# Patient Record
Sex: MALE | Race: WHITE | Employment: FULL TIME | URBAN - METROPOLITAN AREA
[De-identification: names, ages, dates, MRNs, and addresses within clinical notes are randomized per-mention and may not be internally consistent; named-entity substitution may affect disease eponyms.]

---

## 2017-10-04 DIAGNOSIS — I10 ESSENTIAL HYPERTENSION, BENIGN: ICD-10-CM

## 2017-10-04 RX ORDER — LOSARTAN POTASSIUM AND HYDROCHLOROTHIAZIDE 12.5; 5 MG/1; MG/1
TABLET ORAL
Qty: 90 TAB | Refills: 3 | Status: SHIPPED | OUTPATIENT
Start: 2017-10-04 | End: 2019-01-07 | Stop reason: SDUPTHER

## 2017-11-20 ENCOUNTER — OFFICE VISIT (OUTPATIENT)
Dept: INTERNAL MEDICINE CLINIC | Age: 32
End: 2017-11-20

## 2017-11-20 VITALS
HEIGHT: 73 IN | HEART RATE: 85 BPM | BODY MASS INDEX: 31.7 KG/M2 | WEIGHT: 239.2 LBS | DIASTOLIC BLOOD PRESSURE: 78 MMHG | SYSTOLIC BLOOD PRESSURE: 125 MMHG | OXYGEN SATURATION: 97 % | TEMPERATURE: 98.4 F | RESPIRATION RATE: 16 BRPM

## 2017-11-20 DIAGNOSIS — F43.0 STRESS REACTION: ICD-10-CM

## 2017-11-20 DIAGNOSIS — I10 ESSENTIAL HYPERTENSION, BENIGN: ICD-10-CM

## 2017-11-20 DIAGNOSIS — Z00.00 PHYSICAL EXAM: Primary | ICD-10-CM

## 2017-11-20 DIAGNOSIS — Z23 NEED FOR TDAP VACCINATION: ICD-10-CM

## 2017-11-20 NOTE — PATIENT INSTRUCTIONS
Testicular Self-Exam: Care Instructions  Your Care Instructions    A self-exam is a way for you to check for cancer of the testicles. Although testicular cancer is rare, it is one of the most common tumors in men younger than 28. Many testicular cancers are found during self-exam. In the early stages of testicular cancer, the lump, which may be about the size of a pea, usually is not painful. Testicular cancer, especially if treated early, is very often cured. By doing this self-exam regularly, you can learn the normal size, shape, and weight of your testicles. This allows you to note any changes. Follow-up care is a key part of your treatment and safety. Be sure to make and go to all appointments, and call your doctor if you are having problems. It's also a good idea to know your test results and keep a list of the medicines you take. How can you care for yourself at home? · The exam is best done during or after a bath or shower-when the scrotum, the skin sac that holds the testicles, is relaxed. · Stand and place your right leg on a raised surface about chair height. Then gently feel your scrotum until you find the right testicle. · Roll the testicle gently but firmly between your thumb and fingers of both hands. Carefully feel the surface for lumps. Feel for any change in the size, shape, or texture of the testicle. The testicle should feel round and smooth. It is normal for one testicle to be slightly larger than the other one. · Repeat this for the left side. Feel the entire surface of both testicles. · You may feel the epididymis, the soft tube behind each testicle. Become familiar with this structure so that you won't mistake it for a lump. When should you call for help? Call your doctor now or seek immediate medical care if:  ? · You have pain in a testicle. ? Watch closely for changes in your health, and be sure to contact your doctor if:  ? · You notice a change in a testicle.    ? · You notice a lump in a testicle. Where can you learn more? Go to http://italia-andrea.info/. Enter D958 in the search box to learn more about \"Testicular Self-Exam: Care Instructions. \"  Current as of: March 14, 2017  Content Version: 11.4  © 8185-0116 [employer]. Care instructions adapted under license by [employer] (which disclaims liability or warranty for this information). If you have questions about a medical condition or this instruction, always ask your healthcare professional. [address] any warranty or liability for your use of this information.

## 2017-11-20 NOTE — MR AVS SNAPSHOT
Visit Information Date & Time Provider Department Dept. Phone Encounter #  
 11/20/2017  3:00 PM Kristel Julien, [address] [place] [address] Internal Medicine [phone] [id number] Follow-up Instructions Return in about 1 year (around 11/20/2018). Upcoming Health Maintenance Date Due DTaP/Tdap/Td series (2 - Td) 11/20/2027 Allergies as of 11/20/2017  Review Complete On: 11/20/2017 By: Kristel Julien MD  
 No Known Allergies Current Immunizations  Reviewed on 11/20/2017 Name Date Influenza Vaccine 1/6/2017, 10/31/2013 Td 1/1/2010 Tdap 11/20/2017 Reviewed by Dariana Mathew on 11/20/2017 at  3:36 PM  
You Were Diagnosed With   
  
 Codes Comments Physical exam    -  Primary ICD-10-CM: Z00.00 ICD-9-CM: V70.9 Need for Tdap vaccination     ICD-10-CM: X78 ICD-9-CM: V06.1 Essential hypertension, benign     ICD-10-CM: I10 
ICD-9-CM: 401.1 Stress reaction     ICD-10-CM: F43.0 ICD-9-CM: 308. 9 Vitals BP Pulse Temp Resp Height(growth percentile) Weight(growth percentile) 125/78 85 98.4 °F (36.9 °C) (Oral) 16 6' 1\" (1.854 m) 239 lb 3.2 oz (108.5 kg) SpO2 BMI Smoking Status 97% 31.56 kg/m2 Never Smoker Vitals History BMI and BSA Data Body Mass Index Body Surface Area  
 31.56 kg/m 2 2.36 m 2 Preferred Pharmacy Pharmacy Name Phone [phone] [phone] Your Updated Medication List  
  
   
This list is accurate as of: 11/20/17  4:08 PM.  Always use your most recent med list.  
  
  
  
  
 losartan-hydroCHLOROthiazide 50-12.5 mg per tablet Commonly known as:  HYZAAR  
TAKE 1 TABLET BY MOUTH EVERY DAY We Performed the Following CBC WITH AUTOMATED DIFF [51155 CPT(R)] LIPID PANEL [08709 CPT(R)] METABOLIC PANEL, COMPREHENSIVE [24640 CPT(R)] REFERRAL TO DERMATOLOGY [REF19 Custom] TETANUS, DIPHTHERIA TOXOIDS AND ACELLULAR PERTUSSIS VACCINE (TDAP), IN INDIVIDS. >=7, IM [id number] CPT(R)] TSH 3RD GENERATION [80508 CPT(R)] URINALYSIS W/ RFLX MICROSCOPIC [28152 CPT(R)] Follow-up Instructions Return in about 1 year (around 11/20/2018). Referral Information Referral ID Referred By Referred To  
  
 [id number] Sven DENT MD   
   [address] [facility] [address] [place] [address] Avenue Phone: [phone] Fax: [phone] Visits Status Start Date End Date 1 New Request 11/20/17 11/20/18 If your referral has a status of pending review or denied, additional information will be sent to support the outcome of this decision. Patient Instructions Testicular Self-Exam: Care Instructions Your Care Instructions A self-exam is a way for you to check for cancer of the testicles. Although testicular cancer is rare, it is one of the most common tumors in men younger than 28. Many testicular cancers are found during self-exam. In the early stages of testicular cancer, the lump, which may be about the size of a pea, usually is not painful. Testicular cancer, especially if treated early, is very often cured. By doing this self-exam regularly, you can learn the normal size, shape, and weight of your testicles. This allows you to note any changes. Follow-up care is a key part of your treatment and safety. Be sure to make and go to all appointments, and call your doctor if you are having problems. It's also a good idea to know your test results and keep a list of the medicines you take. How can you care for yourself at home? · The exam is best done during or after a bath or shower-when the scrotum, the skin sac that holds the testicles, is relaxed. · Stand and place your right leg on a raised surface about chair height. Then gently feel your scrotum until you find the right testicle. · Roll the testicle gently but firmly between your thumb and fingers of both hands. Carefully feel the surface for lumps. Feel for any change in the size, shape, or texture of the testicle. The testicle should feel round and smooth. It is normal for one testicle to be slightly larger than the other one. · Repeat this for the left side. Feel the entire surface of both testicles. · You may feel the epididymis, the soft tube behind each testicle. Become familiar with this structure so that you won't mistake it for a lump. When should you call for help? Call your doctor now or seek immediate medical care if: 
? · You have pain in a testicle. ? Watch closely for changes in your health, and be sure to contact your doctor if: 
? · You notice a change in a testicle. ? · You notice a lump in a testicle. Where can you learn more? Go to http://italia-andrea.info/. Enter H286 in the search box to learn more about \"Testicular Self-Exam: Care Instructions. \" Current as of: March 14, 2017 Content Version: 11.4 © 9263-8899 [employer]. Care instructions adapted under license by [employer] (which disclaims liability or warranty for this information). If you have questions about a medical condition or this instruction, always ask your healthcare professional. [place] [address] any warranty or liability for your use of this information. Introducing [facility]! [employer] introduces boarding pass patient portal. Now you can access parts of your medical record, email your doctor's office, and request medication refills online. 1. In your internet browser, go to [email or web address] 2. Click on the First Time User? Click Here link in the Sign In box. You will see the New Member Sign Up page. 3. Enter your boarding pass Access Code exactly as it appears below.  You will not need to use this code after youve completed the sign-up process. If you do not sign up before the expiration date, you must request a new code. · [employer]u Access Code: [email or web address] Expires: 2/18/2018  4:08 PM 
 
4. Enter the last four digits of your Social Security Number (xxxx) and Date of Birth (mm/dd/yyyy) as indicated and click Submit. You will be taken to the next sign-up page. 5. Create a Chu Shu ID. This will be your [employer] Sh[employer] login ID and cannot be changed, so think of one that is secure and easy to remember. 6. Create a [employer] Shu password. You can change your password at any time. 7. Enter your Password Reset Question and Answer. This can be used at a later time if you forget your password. 8. Enter your e-mail address. You will receive e-mail notification when new information is available in [address]. 9. Click Sign Up. You can now view and download portions of your medical record. 10. Click the Download Summary menu link to download a portable copy of your medical information. If you have questions, please visit the Frequently Asked Questions section of the [employer] website. Remember, [employer]u is NOT to be used for urgent needs. For medical emergencies, dial 911. Now available from your iPhone and Android! Please provide this summary of care documentation to your next provider. Your primary care clinician is listed as MIAH DENT. If you have any questions after today's visit, please call [phone].

## 2017-11-20 NOTE — PROGRESS NOTES
HISTORY OF PRESENT ILLNESS  Braulio Streeter is a 27 y.o. male. HPI Shila Vogel is seen today for a complete physical examination and follow up of chronic problems. Preventive medicine. Fully reviewed today. He is due for a complete physical examination and routine screening laboratory studies. Also, due for Tdap booster. Chronic medical problems are reviewed. Home blood pressures are excellent averaging 125/78. Review of systems notable for some skin tags. Social history notable for having an 25month old child at this time. The child is healthy. Family history notable for his brother passing away due to heroin overdose. Further, his father has multiple myeloma. '    Review of systems notable for some stress symptoms secondary to this. He had a spell of \"feeling out of it. \"  He denies a loss of consciousness. This happened three times, all in the evening after a hard day at work. The last occasion was five weeks ago. I suspect this is stemming more from a stress reaction but if it is recurrent, I would refer to neurology. MedDATA/gwo     We counseled regarding healthy lifestyle issues including diet, exercise and stress management. Family history, social history, etc. Are reviewed and updated, see electronic record. Review of Systems   Constitutional: Positive for malaise/fatigue. Negative for weight loss. Respiratory: Negative. Cardiovascular: Negative for chest pain, palpitations, leg swelling and PND. Gastrointestinal: Negative. Genitourinary: Negative. Musculoskeletal: Negative for myalgias. Neurological: Negative for focal weakness. Psychiatric/Behavioral: The patient has insomnia. Physical Exam   Constitutional: He is oriented to person, place, and time. He appears well-developed and well-nourished. No distress. HENT:   Head: Normocephalic and atraumatic.    Right Ear: Tympanic membrane, external ear and ear canal normal.   Left Ear: Tympanic membrane, external ear and ear canal normal.   Eyes: EOM are normal. Pupils are equal, round, and reactive to light. Right eye exhibits no discharge. Left eye exhibits no discharge. Neck: Normal range of motion. Neck supple. Carotid bruit is not present. No thyromegaly present. Cardiovascular: Normal rate, regular rhythm, normal heart sounds and intact distal pulses. Exam reveals no gallop and no friction rub. No murmur heard. Pulmonary/Chest: Effort normal and breath sounds normal. No respiratory distress. He has no wheezes. He has no rales. Abdominal: Soft. Bowel sounds are normal. He exhibits no distension and no mass. There is no tenderness. There is no rebound and no guarding. Musculoskeletal: Normal range of motion. He exhibits no edema or tenderness. Lymphadenopathy:     He has no cervical adenopathy. Neurological: He is alert and oriented to person, place, and time. He has normal reflexes. Skin: Skin is warm and dry. No rash noted. Psychiatric: He has a normal mood and affect. His behavior is normal.   Nursing note and vitals reviewed. ASSESSMENT and PLAN  Diagnoses and all orders for this visit:    1. Physical exam  -     TSH 3RD GENERATION  -     METABOLIC PANEL, COMPREHENSIVE  -     CBC WITH AUTOMATED DIFF  -     LIPID PANEL  -     URINALYSIS W/ RFLX MICROSCOPIC  -     REFERRAL TO DERMATOLOGY    2. Need for Tdap vaccination  -     TETANUS, DIPHTHERIA TOXOIDS AND ACELLULAR PERTUSSIS VACCINE (TDAP), IN INDIVIDS. >=7, IM    3. Essential hypertension, benign- Continue current regimen of prescription and / or OTC medications     4.  Stress reaction- Call with recurrence

## 2018-01-16 LAB
ALBUMIN SERPL-MCNC: 4.7 G/DL (ref 3.5–5.5)
ALBUMIN/GLOB SERPL: 1.6 {RATIO} (ref 1.2–2.2)
ALP SERPL-CCNC: 63 IU/L (ref 39–117)
ALT SERPL-CCNC: 74 IU/L (ref 0–44)
APPEARANCE UR: CLEAR
AST SERPL-CCNC: 48 IU/L (ref 0–40)
BASOPHILS # BLD AUTO: 0.1 X10E3/UL (ref 0–0.2)
BASOPHILS NFR BLD AUTO: 1 %
BILIRUB SERPL-MCNC: 0.3 MG/DL (ref 0–1.2)
BILIRUB UR QL STRIP: NEGATIVE
BUN SERPL-MCNC: 16 MG/DL (ref 6–20)
BUN/CREAT SERPL: 17 (ref 9–20)
CALCIUM SERPL-MCNC: 9.6 MG/DL (ref 8.7–10.2)
CHLORIDE SERPL-SCNC: 99 MMOL/L (ref 96–106)
CHOLEST SERPL-MCNC: 266 MG/DL (ref 100–199)
CO2 SERPL-SCNC: 23 MMOL/L (ref 18–29)
COLOR UR: YELLOW
CREAT SERPL-MCNC: 0.93 MG/DL (ref 0.76–1.27)
EOSINOPHIL # BLD AUTO: 0.3 X10E3/UL (ref 0–0.4)
EOSINOPHIL NFR BLD AUTO: 5 %
ERYTHROCYTE [DISTWIDTH] IN BLOOD BY AUTOMATED COUNT: 13.9 % (ref 12.3–15.4)
GLOBULIN SER CALC-MCNC: 2.9 G/DL (ref 1.5–4.5)
GLUCOSE SERPL-MCNC: 98 MG/DL (ref 65–99)
GLUCOSE UR QL: NEGATIVE
HCT VFR BLD AUTO: 45.2 % (ref 37.5–51)
HDLC SERPL-MCNC: 41 MG/DL
HGB BLD-MCNC: 15.6 G/DL (ref 13–17.7)
HGB UR QL STRIP: NEGATIVE
IMM GRANULOCYTES # BLD: 0 X10E3/UL (ref 0–0.1)
IMM GRANULOCYTES NFR BLD: 1 %
KETONES UR QL STRIP: NEGATIVE
LDLC SERPL CALC-MCNC: 168 MG/DL (ref 0–99)
LEUKOCYTE ESTERASE UR QL STRIP: NEGATIVE
LYMPHOCYTES # BLD AUTO: 2.3 X10E3/UL (ref 0.7–3.1)
LYMPHOCYTES NFR BLD AUTO: 39 %
MCH RBC QN AUTO: 32 PG (ref 26.6–33)
MCHC RBC AUTO-ENTMCNC: 34.5 G/DL (ref 31.5–35.7)
MCV RBC AUTO: 93 FL (ref 79–97)
MICRO URNS: NORMAL
MONOCYTES # BLD AUTO: 0.6 X10E3/UL (ref 0.1–0.9)
MONOCYTES NFR BLD AUTO: 10 %
NEUTROPHILS # BLD AUTO: 2.6 X10E3/UL (ref 1.4–7)
NEUTROPHILS NFR BLD AUTO: 44 %
NITRITE UR QL STRIP: NEGATIVE
PH UR STRIP: 5.5 [PH] (ref 5–7.5)
PLATELET # BLD AUTO: 261 X10E3/UL (ref 150–379)
POTASSIUM SERPL-SCNC: 4.2 MMOL/L (ref 3.5–5.2)
PROT SERPL-MCNC: 7.6 G/DL (ref 6–8.5)
PROT UR QL STRIP: NEGATIVE
RBC # BLD AUTO: 4.88 X10E6/UL (ref 4.14–5.8)
SODIUM SERPL-SCNC: 139 MMOL/L (ref 134–144)
SP GR UR: 1.02 (ref 1–1.03)
TRIGL SERPL-MCNC: 286 MG/DL (ref 0–149)
TSH SERPL DL<=0.005 MIU/L-ACNC: 6.51 UIU/ML (ref 0.45–4.5)
UROBILINOGEN UR STRIP-MCNC: 0.2 MG/DL (ref 0.2–1)
VLDLC SERPL CALC-MCNC: 57 MG/DL (ref 5–40)
WBC # BLD AUTO: 5.9 X10E3/UL (ref 3.4–10.8)

## 2018-01-16 NOTE — PROGRESS NOTES
[facility] - spoke to [employer] - added on ggt, cpk (dx abnormal transaminase enzymes - R74.0) and thyroid peroxidase antibody (dx abnormal thyroid function - R94.6).

## 2018-01-16 NOTE — PROGRESS NOTES
Add on thyroid peroxidase antibody.  Dx= abnormal thyroid function \  Add on ggt and cpk- Dx= abnormal transaminase enzymes   Will Review at followup

## 2018-01-17 LAB
CK SERPL-CCNC: 157 U/L (ref 24–204)
GGT SERPL-CCNC: 120 IU/L (ref 0–65)
SPECIMEN STATUS REPORT, ROLRST: NORMAL
THYROPEROXIDASE AB SERPL-ACNC: 116 IU/ML (ref 0–34)

## 2018-01-18 ENCOUNTER — OFFICE VISIT (OUTPATIENT)
Dept: INTERNAL MEDICINE CLINIC | Age: 33
End: 2018-01-18

## 2018-01-18 VITALS
BODY MASS INDEX: 32.71 KG/M2 | SYSTOLIC BLOOD PRESSURE: 122 MMHG | HEART RATE: 112 BPM | HEIGHT: 73 IN | RESPIRATION RATE: 18 BRPM | DIASTOLIC BLOOD PRESSURE: 82 MMHG | TEMPERATURE: 98.2 F | OXYGEN SATURATION: 96 % | WEIGHT: 246.8 LBS

## 2018-01-18 DIAGNOSIS — I10 ESSENTIAL HYPERTENSION, BENIGN: ICD-10-CM

## 2018-01-18 DIAGNOSIS — E06.3 AUTOIMMUNE HYPOTHYROIDISM: Primary | ICD-10-CM

## 2018-01-18 DIAGNOSIS — E78.2 MIXED HYPERLIPIDEMIA: ICD-10-CM

## 2018-01-18 RX ORDER — LEVOTHYROXINE SODIUM 75 UG/1
75 TABLET ORAL
Qty: 30 TAB | Refills: 11 | Status: SHIPPED | OUTPATIENT
Start: 2018-01-18 | End: 2018-02-14 | Stop reason: SDUPTHER

## 2018-01-18 NOTE — PATIENT INSTRUCTIONS

## 2018-01-18 NOTE — PROGRESS NOTES
HISTORY OF PRESENT ILLNESS  Ginny Bryant is a 27 y.o. male. LEX Ortiz is seen today accompanied by his wife for follow up of possible anxiety as well as lab abnormalities. 1. Anxiety. He is in counseling which he has found very helpful. He has had a lot of stress in his life recently with the death of his brother, a new child at home as well as work stress. Physical symptoms of his anxiety include dizziness and sleep problems. Sleep issues are certainly chronic but possibly worsened recently given stressors. Question sleep apnea has also been raised. He has severe nightmares at times. Upon further questioning, it turns out he has quite poor sleep hygiene often times sleeping on the couch, falling asleep in front of the television. I reviewed with him some basics of setting a particular bedtime and trying to stick to that. Also, avoiding electronic stimulation prior to bed. If his symptoms persist following treatment of other medical problems, see below, we will defer for a sleep medicine evaluation. If his anxiety is persistent at that time, we will also consider medication treatment. 2. Hypothyroidism. His TSH is elevated with an abnormal TPO antibody. Reviewed with them treatment with Levothyroxine. This could very well benefit many of his problems. Check in three months. 3. Hyperlipidemia. Reviewed some diet changes, but will also check with treatment of his thyroid disease. 4. Abnormal GGT. About a month ago he cut way back on alcohol, in fact, is abstinent at this time. We will recheck in three months. 5. Elevated blood pressure. Much better at home. Review of systems notable for overall low energy. He wonders about low testosterone. His weight is increased. MedDATA/gwo     History reviewed. No pertinent past medical history. Current Outpatient Prescriptions   Medication Sig    levothyroxine (SYNTHROID) 75 mcg tablet Take 1 Tab by mouth Daily (before breakfast).  Indications: Hashimoto Thyroiditis    losartan-hydroCHLOROthiazide (HYZAAR) 50-12.5 mg per tablet TAKE 1 TABLET BY MOUTH EVERY DAY     No current facility-administered medications for this visit. Review of Systems   Constitutional: Positive for malaise/fatigue. Neurological: Positive for dizziness. Physical Exam   Constitutional: No distress. Neck: No thyromegaly present. Cardiovascular: Normal rate and regular rhythm. Exam reveals no gallop and no friction rub. No murmur heard. Pulmonary/Chest: Effort normal and breath sounds normal.   Nursing note and vitals reviewed. ASSESSMENT and PLAN  Diagnoses and all orders for this visit:    1. Autoimmune hypothyroidism  -     levothyroxine (SYNTHROID) 75 mcg tablet; Take 1 Tab by mouth Daily (before breakfast). Indications: Hashimoto Thyroiditis  -     TSH 3RD GENERATION  -     T3, FREE  -     T4, FREE    2. Essential hypertension, benign  -     levothyroxine (SYNTHROID) 75 mcg tablet; Take 1 Tab by mouth Daily (before breakfast). Indications: Hashimoto Thyroiditis  -     TSH 3RD GENERATION  -     T3, FREE  -     T4, FREE    3. Mixed hyperlipidemia- jania once thyroid is normalized  -     levothyroxine (SYNTHROID) 75 mcg tablet; Take 1 Tab by mouth Daily (before breakfast). Indications: Hashimoto Thyroiditis  -     TSH 3RD GENERATION  -     T3, FREE  -     T4, FREE    4.  Abnormal LFT- jania once thyroid is normalized    Plan was reviewed with patient and family, understanding expressed

## 2018-01-18 NOTE — MR AVS SNAPSHOT
[address] [facility] [address] [place] [address] 
[phone] Patient: Donte Garcia MRN: [id number] ZEQ:[id number]/[id number] Visit Information Date & Time Provider Department Dept. Phone Encounter #  
 1/18/2018  3:25 PM Cyrus Woodard, [facility] Internal Medicine [phone] [id number] Follow-up Instructions Return in about 3 months (around 4/18/2018). Your Appointments 11/21/2018  3:50 PM  
COMPLETE PHYSICAL with Cyrus Woodard MD  
[facility] Internal Medicine [facility]) Appt Note: CPE (11/20/17)  
 [address] [facility] [address] [place] [address] [place] [address]  
[facility] [address] [address] [place] [address] Upcoming Health Maintenance Date Due DTaP/Tdap/Td series (2 - Td) 11/20/2027 Allergies as of 1/18/2018  Review Complete On: 1/18/2018 By: Cyrus Woodard MD  
 No Known Allergies Current Immunizations  Reviewed on 11/20/2017 Name Date Influenza Vaccine 1/6/2017, 10/31/2013 Td 1/1/2010 Tdap 11/20/2017 Not reviewed this visit You Were Diagnosed With   
  
 Codes Comments Autoimmune hypothyroidism    -  Primary ICD-10-CM: E03.9 ICD-9-CM: 244.9 Essential hypertension, benign     ICD-10-CM: I10 
ICD-9-CM: 401.1 Mixed hyperlipidemia     ICD-10-CM: E78.2 ICD-9-CM: 272.2 Vitals BP Pulse Temp Resp Height(growth percentile) Weight(growth percentile) 122/82 (!) 112 98.2 °F (36.8 °C) (Oral) 18 6' 1\" (1.854 m) 246 lb 12.8 oz (111.9 kg) SpO2 BMI Smoking Status 96% 32.56 kg/m2 Never Smoker Vitals History BMI and BSA Data Body Mass Index Body Surface Area 32.56 kg/m 2 2.4 m 2 Preferred Pharmacy Pharmacy Name Phone [id number] Elis Heredia, [address] [facility] [phone] Your Updated Medication List  
  
   
 This list is accurate as of: 1/18/18  4:23 PM.  Always use your most recent med list.  
  
  
  
  
 levothyroxine 75 mcg tablet Commonly known as:  SYNTHROID Take 1 Tab by mouth Daily (before breakfast). Indications: Hashimoto Thyroiditis  
  
 losartan-hydroCHLOROthiazide 50-12.5 mg per tablet Commonly known as:  HYZAAR  
TAKE 1 TABLET BY MOUTH EVERY DAY Prescriptions Sent to Pharmacy Refills  
 levothyroxine (SYNTHROID) 75 mcg tablet 11 Sig: Take 1 Tab by mouth Daily (before breakfast). Indications: Hashimoto Thyroiditis Class: Normal  
 [facility], [address] [place] [address] #: [phone] Route: Oral  
  
We Performed the Following T3, FREE [id number] CPT(R)] T4, FREE [id number] CPT(R)] TSH 3RD GENERATION [16643 CPT(R)] Follow-up Instructions Return in about 3 months (around 4/18/2018). Patient Instructions Hypothyroidism: Care Instructions Your Care Instructions You have hypothyroidism, which means that your body is not making enough thyroid hormone. This hormone helps your body use energy. If your thyroid level is low, you may feel tired, be constipated, have an increase in your blood pressure, or have dry skin or memory problems. You may also get cold easily, even when it is warm. Women with low thyroid levels may have heavy menstrual periods. A blood test to find your thyroid-stimulating hormone (TSH) level is used to check for hypothyroidism. A high TSH level may mean that you have low thyroid. When your body is not making enough thyroid hormone, TSH levels rise in an effort to make the body produce more. The treatment for hypothyroidism is to take thyroid hormone pills. You should start to feel better in 1 to 2 weeks. But it can take several months to see changes in the TSH level. You will need regular visits with your doctor to make sure you have the right dose of medicine. Most people need treatment for the rest of their lives. You will need to see your doctor regularly to have blood tests and to make sure you are doing well. Follow-up care is a key part of your treatment and safety. Be sure to make and go to all appointments, and call your doctor if you are having problems. It's also a good idea to know your test results and keep a list of the medicines you take. How can you care for yourself at home? · Take your thyroid hormone medicine exactly as prescribed. Call your doctor if you think you are having a problem with your medicine. Most people do not have side effects if they take the right amount of medicine regularly. ¨ Take the medicine 30 minutes before breakfast, and do not take it with calcium, vitamins, or iron. ¨ Do not take extra doses of your thyroid medicine. It will not help you get better any faster, and it may cause side effects. ¨ If you forget to take a dose, do NOT take a double dose of medicine. Take your usual dose the next day. · Tell your doctor about all prescription, herbal, or over-the-counter products you take. · Take care of yourself. Eat a healthy diet, get enough sleep, and get regular exercise. When should you call for help? Call 911 anytime you think you may need emergency care. For example, call if: 
? · You passed out (lost consciousness). ? · You have severe trouble breathing. ? · You have a very slow heartbeat (less than 60 beats a minute). ? · You have a low body temperature (95°F or below). ?Call your doctor now or seek immediate medical care if: 
? · You feel tired, sluggish, or weak. ? · You have trouble remembering things or concentrating. ? · You do not begin to feel better 2 weeks after starting your medicine. ? Watch closely for changes in your health, and be sure to contact your doctor if you have any problems. Where can you learn more? Go to http://italia-andrea.info/. Enter V032 in the search box to learn more about \"Hypothyroidism: Care Instructions. \" Current as of: May 12, 2017 Content Version: 11.4 © 9486-3306 Healthwise, Repligen. Care instructions adapted under license by [employer] (which disclaims liability or warranty for this information). If you have questions about a medical condition or this instruction, always ask your healthcare professional. [address] any warranty or liability for your use of this information. Introducing [facility] & HEALTH SERVICES! [facility] introduces 5151tuan patient portal. Now you can access parts of your medical record, email your doctor's office, and request medication refills online. 1. In your internet browser, go to [email or web address] 2. Click on the First Time User? Click Here link in the Sign In box. You will see the New Member Sign Up page. 3. Enter your 5151tuan Access Code exactly as it appears below. You will not need to use this code after youve completed the sign-up process. If you do not sign up before the expiration date, you must request a new code. · 5151tuan Access Code: [email or web address] Expires: 2/18/2018  4:08 PM 
 
4. Enter the last four digits of your Social Security Number (xxxx) and Date of Birth (mm/dd/yyyy) as indicated and click Submit. You will be taken to the next sign-up page. 5. Create a 5151tuan ID. This will be your 5151tuan login ID and cannot be changed, so think of one that is secure and easy to remember. 6. Create a 5151tuan password. You can change your password at any time. 7. Enter your Password Reset Question and Answer. This can be used at a later time if you forget your password. 8. Enter your e-mail address. You will receive e-mail notification when new information is available in [address] 9. Click Sign Up. You can now view and download portions of your medical record. 10. Click the Download Summary menu link to download a portable copy of your medical information. If you have questions, please visit the Frequently Asked Questions section of the Mapado website. Remember, Mapado is NOT to be used for urgent needs. For medical emergencies, dial 911. Now available from your iPhone and Android! Please provide this summary of care documentation to your next provider. Your primary care clinician is listed as MIAH DENT. If you have any questions after today's visit, please call [phone].

## 2018-01-24 ENCOUNTER — TELEPHONE (OUTPATIENT)
Dept: INTERNAL MEDICINE CLINIC | Age: 33
End: 2018-01-24

## 2018-01-24 NOTE — TELEPHONE ENCOUNTER
[phone] pt is calling regarding two of his rx's. He says that he was given a med for his thyroid yesterday and he wants to know if he can take it with his bp meds.

## 2018-01-30 NOTE — TELEPHONE ENCOUNTER
Called pt and left detailed v/m that he should take the thyroid med on empty stomach, one hour later with b'fast may take the BP med. In general there's no interaction. Informed pt to call us back at the office if any further questions.

## 2018-02-01 ENCOUNTER — OFFICE VISIT (OUTPATIENT)
Dept: INTERNAL MEDICINE CLINIC | Age: 33
End: 2018-02-01

## 2018-02-01 VITALS
OXYGEN SATURATION: 97 % | HEIGHT: 73 IN | RESPIRATION RATE: 16 BRPM | DIASTOLIC BLOOD PRESSURE: 104 MMHG | BODY MASS INDEX: 31.68 KG/M2 | WEIGHT: 239 LBS | TEMPERATURE: 98.7 F | SYSTOLIC BLOOD PRESSURE: 148 MMHG | HEART RATE: 118 BPM

## 2018-02-01 DIAGNOSIS — R68.89 FLU-LIKE SYMPTOMS: ICD-10-CM

## 2018-02-01 DIAGNOSIS — A08.4 VIRAL GASTROENTERITIS: Primary | ICD-10-CM

## 2018-02-01 LAB
QUICKVUE INFLUENZA TEST: NEGATIVE
VALID INTERNAL CONTROL?: YES

## 2018-02-01 RX ORDER — ONDANSETRON 8 MG/1
8 TABLET, ORALLY DISINTEGRATING ORAL
Qty: 10 TAB | Refills: 0 | Status: SHIPPED | OUTPATIENT
Start: 2018-02-01 | End: 2018-03-22 | Stop reason: ALTCHOICE

## 2018-02-01 NOTE — PATIENT INSTRUCTIONS
Gastroenteritis: Care Instructions  Your Care Instructions    Gastroenteritis is an illness that may cause nausea, vomiting, and diarrhea. It is sometimes called \"stomach flu. \" It can be caused by bacteria or a virus. You will probably begin to feel better in 1 to 2 days. In the meantime, get plenty of rest and make sure you do not become dehydrated. Dehydration occurs when your body loses too much fluid. Follow-up care is a key part of your treatment and safety. Be sure to make and go to all appointments, and call your doctor if you are having problems. It's also a good idea to know your test results and keep a list of the medicines you take. How can you care for yourself at home? · If your doctor prescribed antibiotics, take them as directed. Do not stop taking them just because you feel better. You need to take the full course of antibiotics. · Drink plenty of fluids to prevent dehydration, enough so that your urine is light yellow or clear like water. Choose water and other caffeine-free clear liquids until you feel better. If you have kidney, heart, or liver disease and have to limit fluids, talk with your doctor before you increase your fluid intake. · Drink fluids slowly, in frequent, small amounts, because drinking too much too fast can cause vomiting. · Begin eating mild foods, such as dry toast, yogurt, applesauce, bananas, and rice. Avoid spicy, hot, or high-fat foods, and do not drink alcohol or caffeine for a day or two. Do not drink milk or eat ice cream until you are feeling better. How to prevent gastroenteritis  · Keep hot foods hot and cold foods cold. · Do not eat meats, dressings, salads, or other foods that have been kept at room temperature for more than 2 hours. · Use a thermometer to check your refrigerator. It should be between 34°F and 40°F.  · Defrost meats in the refrigerator or microwave, not on the kitchen counter. · Keep your hands and your kitchen clean.  Wash your hands, cutting boards, and countertops with hot soapy water frequently. · Cook meat until it is well done. · Do not eat raw eggs or uncooked sauces made with raw eggs. · Do not take chances. If food looks or tastes spoiled, throw it out. When should you call for help? Call 911 anytime you think you may need emergency care. For example, call if:  ? · You vomit blood or what looks like coffee grounds. ? · You passed out (lost consciousness). ? · You pass maroon or very bloody stools. ?Call your doctor now or seek immediate medical care if:  ? · You have severe belly pain. ? · You have signs of needing more fluids. You have sunken eyes, a dry mouth, and pass only a little dark urine. ? · You feel like you are going to faint. ? · You have increased belly pain that does not go away in 1 to 2 days. ? · You have new or increased nausea, or you are vomiting. ? · You have a new or higher fever. ? · Your stools are black and tarlike or have streaks of blood. ? Watch closely for changes in your health, and be sure to contact your doctor if:  ? · You are dizzy or lightheaded. ? · You urinate less than usual, or your urine is dark yellow or brown. ? · You do not feel better with each day that goes by. Where can you learn more? Go to http://italia-andrea.info/. Enter N142 in the search box to learn more about \"Gastroenteritis: Care Instructions. \"  Current as of: March 3, 2017  Content Version: 11.4  © 3797-3064 Me[employer]s. Care instructions adapted under license by [employer] (which disclaims liability or warranty for this information). If you have questions about a medical condition or this instruction, always ask your healthcare professional. [address] any warranty or liability for your use of this information.

## 2018-02-01 NOTE — PROGRESS NOTES
HISTORY OF PRESENT ILLNESS  Ash Santizo is a 27 y.o. male. URI    The history is provided by the patient. This is a new problem. The current episode started yesterday. Patient reports a subjective fever - was not measured. Associated symptoms include diarrhea, nausea, congestion and cough. Pertinent negatives include no abdominal pain, no vomiting and no sore throat. Treatments tried: dayquil. The treatment provided mild relief. Review of Systems   HENT: Positive for congestion. Negative for sore throat. Respiratory: Positive for cough. Gastrointestinal: Positive for diarrhea and nausea. Negative for abdominal pain and vomiting. Physical Exam   Constitutional: No distress. HENT:   Right Ear: Tympanic membrane and ear canal normal.   Left Ear: Tympanic membrane and ear canal normal.   Nose: Right sinus exhibits no maxillary sinus tenderness and no frontal sinus tenderness. Left sinus exhibits no maxillary sinus tenderness and no frontal sinus tenderness. Mouth/Throat: Posterior oropharyngeal edema and posterior oropharyngeal erythema present. No oropharyngeal exudate. Eyes: Conjunctivae are normal. Right eye exhibits no discharge. Left eye exhibits no discharge. Neck: Neck supple. Cardiovascular: Normal rate and regular rhythm. Exam reveals no gallop and no friction rub. No murmur heard. Pulmonary/Chest: Effort normal and breath sounds normal.   Abdominal: Soft. Bowel sounds are normal. He exhibits no distension. There is no tenderness. There is no rebound and no guarding. Lymphadenopathy:     He has no cervical adenopathy. Nursing note and vitals reviewed. ASSESSMENT and PLAN  Diagnoses and all orders for this visit:    1. Viral gastroenteritis- likely dx  -     ondansetron (ZOFRAN ODT) 8 mg disintegrating tablet; Take 1 Tab by mouth every eight (8) hours as needed for Nausea.      2. Flu-like symptoms- ruled out

## 2018-02-14 DIAGNOSIS — E78.2 MIXED HYPERLIPIDEMIA: ICD-10-CM

## 2018-02-14 DIAGNOSIS — I10 ESSENTIAL HYPERTENSION, BENIGN: ICD-10-CM

## 2018-02-14 DIAGNOSIS — E06.3 AUTOIMMUNE HYPOTHYROIDISM: ICD-10-CM

## 2018-02-14 RX ORDER — LEVOTHYROXINE SODIUM 75 UG/1
75 TABLET ORAL
Qty: 90 TAB | Refills: 3 | Status: SHIPPED | OUTPATIENT
Start: 2018-02-14 | End: 2019-03-07 | Stop reason: SDUPTHER

## 2018-03-22 ENCOUNTER — OFFICE VISIT (OUTPATIENT)
Dept: INTERNAL MEDICINE CLINIC | Age: 33
End: 2018-03-22

## 2018-03-22 VITALS
DIASTOLIC BLOOD PRESSURE: 86 MMHG | HEIGHT: 73 IN | BODY MASS INDEX: 32.26 KG/M2 | TEMPERATURE: 98 F | WEIGHT: 243.4 LBS | RESPIRATION RATE: 18 BRPM | SYSTOLIC BLOOD PRESSURE: 136 MMHG | OXYGEN SATURATION: 98 % | HEART RATE: 138 BPM

## 2018-03-22 DIAGNOSIS — E06.3 AUTOIMMUNE HYPOTHYROIDISM: ICD-10-CM

## 2018-03-22 DIAGNOSIS — I10 ESSENTIAL HYPERTENSION, BENIGN: ICD-10-CM

## 2018-03-22 DIAGNOSIS — F34.1 DYSTHYMIA: Primary | ICD-10-CM

## 2018-03-22 DIAGNOSIS — F32.A MILD DEPRESSION: ICD-10-CM

## 2018-03-22 RX ORDER — ESCITALOPRAM OXALATE 10 MG/1
10 TABLET ORAL DAILY
Qty: 30 TAB | Refills: 1 | Status: SHIPPED | OUTPATIENT
Start: 2018-03-22 | End: 2018-04-20 | Stop reason: SDUPTHER

## 2018-03-22 NOTE — MR AVS SNAPSHOT
[address] [facility] [address] [place] [address] 
[phone] Patient: Daryle Clunes MRN: [id number] EIO:[id number] Visit Information Date & Time Provider Department Dept. Phone Encounter #  
 3/22/2018  9:35 AM Lala Souza MD [facility] Internal Medicine [phone] [id number] Follow-up Instructions Return in about 3 weeks (around 4/12/2018). Your Appointments 4/18/2018  4:35 PM  
ROUTINE CARE with Lala Souza MD  
[facility] Internal Medicine [address] [facility]) Appt Note: f/u  
 [address] [facility] [address]  
Sherita Child [address] [place]  
  
    
 11/21/2018  3:50 PM  
Complete Physical with Lala Souza MD  
[facility] Internal Medicine [facility]) Appt Note: CPE (11/20/17)  
 [address] [facility] [address]  
Sherita Child [address] [address] [place] [address] Upcoming Health Maintenance Date Due DTaP/Tdap/Td series (2 - Td) 11/20/2027 Allergies as of 3/22/2018  Review Complete On: 3/22/2018 By: Lala Souza MD  
 No Known Allergies Current Immunizations  Reviewed on 11/20/2017 Name Date Influenza Vaccine 1/6/2017, 10/31/2013 Td 1/1/2010 Tdap 11/20/2017 Not reviewed this visit You Were Diagnosed With   
  
 Codes Comments Dysthymia    -  Primary ICD-10-CM: F34.1 ICD-9-CM: 300.4 Essential hypertension, benign     ICD-10-CM: I10 
ICD-9-CM: 401.1 Autoimmune hypothyroidism     ICD-10-CM: E03.9 ICD-9-CM: 115. 9 Vitals BP Pulse Temp Resp Height(growth percentile) Weight(growth percentile) 136/86 (!) 138 98 °F (36.7 °C) (Oral) 18 6' 1\" (1.854 m) 243 lb 6.4 oz (110.4 kg) SpO2 BMI Smoking Status 98% 32.11 kg/m2 Never Smoker Vitals History BMI and BSA Data Body Mass Index Body Surface Area 32.11 kg/m 2 2.38 m 2 Preferred Pharmacy Pharmacy Name Phone [phone] [phone] Your Updated Medication List  
  
   
This list is accurate as of 3/22/18 10:24 AM.  Always use your most recent med list.  
  
  
  
  
 escitalopram oxalate 10 mg tablet Commonly known as:  Yefri Power Take 1 Tab by mouth daily. levothyroxine 75 mcg tablet Commonly known as:  SYNTHROID Take 1 Tab by mouth Daily (before breakfast). Indications: Hashimoto Thyroiditis  
  
 losartan-hydroCHLOROthiazide 50-12.5 mg per tablet Commonly known as:  HYZAAR  
TAKE 1 TABLET BY MOUTH EVERY DAY Prescriptions Sent to Pharmacy Refills  
 escitalopram oxalate (LEXAPRO) 10 mg tablet 1 Sig: Take 1 Tab by mouth daily. Class: Normal  
 Pharmacy: [address] #: [phone] Route: Oral  
  
Follow-up Instructions Return in about 3 weeks (around 4/12/2018). Introducing [facility] & HEALTH SERVICES! Dear Darrian Whitaker: Thank you for requesting a [facility] account. Our records indicate that you already have an active [facility] account. You can access your account anytime at [email or web address] Did you know that you can access your hospital and ER discharge instructions at any time in [facility]? You can also review all of your test results from your hospital stay or ER visit. Additional Information If you have questions, please visit the Frequently Asked Questions section of the [facility] website at [email or web address]. [email or web address]/[employer]/. Remember, [facility] is NOT to be used for urgent needs. For medical emergencies, dial 911. Now available from your iPhone and Android! Please provide this summary of care documentation to your next provider. Your primary care clinician is listed as MIAH DENT. If you have any questions after today's visit, please call [phone].

## 2018-03-22 NOTE — PROGRESS NOTES
HISTORY OF PRESENT ILLNESS  Erika Carson is a 27 y.o. male. HPI Helen Gomez is seen today for a follow up of depression symptoms. 1.  Depression with anxiety. This has been present for 7 months. We reviewed this at his last visit. We were going to defer treatment to see how he did with initiation of thyroid medication as this condition appeared concurrently. He has felt somewhat better with thyroid replacement, but still does not feel his normal self. He describes decreased motivation and being very emotionally labile. This is starting to affect relationships as well as work. He has a definite family history of both parents being on treatment with antidepressants. Reviewed medication treatment. He is in counseling also. Advised a trial of Lexapro. He will start this and follow up in 2 weeks. Reviewed potential side effects, goals of treatment and the need for follow up. 2.  Hypertension with white coat syndrome, stable home readings. 3.  Hypothyroidism, due for recheck of labs. 4.  Sleep disturbance, evaluation pending later today for sleep apnea and sleep disturbance. Review of Systems   Constitutional: Negative for weight loss. Respiratory: Negative. Cardiovascular: Negative for chest pain, palpitations, leg swelling and PND. Musculoskeletal: Negative for myalgias. Neurological: Negative for focal weakness. Psychiatric/Behavioral: Positive for depression. The patient is nervous/anxious. Physical Exam   Constitutional: No distress. Cardiovascular: Normal rate and regular rhythm. Exam reveals no gallop and no friction rub. No murmur heard. Pulmonary/Chest: Effort normal and breath sounds normal.   Psychiatric: He has a normal mood and affect. His behavior is normal.   Nursing note and vitals reviewed. ASSESSMENT and PLAN  Diagnoses and all orders for this visit:    1. Dysthymia  -     escitalopram oxalate (LEXAPRO) 10 mg tablet;  Take 1 Tab by mouth daily.    2. Essential hypertension, benign- Continue current regimen of prescription and / or OTC medications     3. Autoimmune hypothyroidism- check labs    4.  Mild depression ([facility] [address]- as above

## 2018-03-24 PROBLEM — F32.A MILD DEPRESSION: Status: ACTIVE | Noted: 2018-03-24

## 2018-04-16 DIAGNOSIS — E06.3 AUTOIMMUNE HYPOTHYROIDISM: Primary | ICD-10-CM

## 2018-04-16 DIAGNOSIS — R79.89 ABNORMAL LFTS: ICD-10-CM

## 2018-04-18 ENCOUNTER — OFFICE VISIT (OUTPATIENT)
Dept: INTERNAL MEDICINE CLINIC | Age: 33
End: 2018-04-18

## 2018-04-18 ENCOUNTER — HOSPITAL ENCOUNTER (INPATIENT)
Age: 33
LOS: 1 days | Discharge: HOME | DRG: 881 | End: 2018-04-19
Attending: EMERGENCY MEDICINE | Admitting: PSYCHIATRY & NEUROLOGY
Payer: COMMERCIAL

## 2018-04-18 VITALS
OXYGEN SATURATION: 98 % | BODY MASS INDEX: 31.75 KG/M2 | RESPIRATION RATE: 18 BRPM | HEART RATE: 97 BPM | HEIGHT: 73 IN | WEIGHT: 239.6 LBS | DIASTOLIC BLOOD PRESSURE: 99 MMHG | TEMPERATURE: 98.5 F | SYSTOLIC BLOOD PRESSURE: 132 MMHG

## 2018-04-18 DIAGNOSIS — F32.2 SEVERE MAJOR DEPRESSION, SINGLE EPISODE (HCC): Primary | ICD-10-CM

## 2018-04-18 DIAGNOSIS — F32.A DEPRESSION, UNSPECIFIED DEPRESSION TYPE: Primary | ICD-10-CM

## 2018-04-18 DIAGNOSIS — R45.851 SUICIDAL IDEATIONS: ICD-10-CM

## 2018-04-18 LAB
ALBUMIN SERPL-MCNC: 4.2 G/DL (ref 3.5–5)
ALBUMIN/GLOB SERPL: 0.7 {RATIO} (ref 1.1–2.2)
ALP SERPL-CCNC: 68 U/L (ref 45–117)
ALT SERPL-CCNC: 123 U/L (ref 12–78)
AMPHET UR QL SCN: NEGATIVE
ANION GAP SERPL CALC-SCNC: 14 MMOL/L (ref 5–15)
APAP SERPL-MCNC: <2 UG/ML (ref 10–30)
APPEARANCE UR: ABNORMAL
AST SERPL-CCNC: 69 U/L (ref 15–37)
BACTERIA URNS QL MICRO: NEGATIVE /HPF
BARBITURATES UR QL SCN: NEGATIVE
BASOPHILS # BLD: 0.1 K/UL (ref 0–0.1)
BASOPHILS NFR BLD: 1 % (ref 0–1)
BENZODIAZ UR QL: NEGATIVE
BILIRUB SERPL-MCNC: 0.7 MG/DL (ref 0.2–1)
BILIRUB UR QL CFM: NEGATIVE
BUN SERPL-MCNC: 25 MG/DL (ref 6–20)
BUN/CREAT SERPL: 20 (ref 12–20)
CALCIUM SERPL-MCNC: 8.4 MG/DL (ref 8.5–10.1)
CANNABINOIDS UR QL SCN: NEGATIVE
CHLORIDE SERPL-SCNC: 101 MMOL/L (ref 97–108)
CO2 SERPL-SCNC: 24 MMOL/L (ref 21–32)
COCAINE UR QL SCN: NEGATIVE
COLOR UR: ABNORMAL
CREAT SERPL-MCNC: 1.28 MG/DL (ref 0.7–1.3)
DIFFERENTIAL METHOD BLD: ABNORMAL
DRUG SCRN COMMENT,DRGCM: NORMAL
EOSINOPHIL # BLD: 0.1 K/UL (ref 0–0.4)
EOSINOPHIL NFR BLD: 1 % (ref 0–7)
EPITH CASTS URNS QL MICRO: ABNORMAL /LPF
ERYTHROCYTE [DISTWIDTH] IN BLOOD BY AUTOMATED COUNT: 13.6 % (ref 11.5–14.5)
ETHANOL SERPL-MCNC: 321 MG/DL
GLOBULIN SER CALC-MCNC: 5.7 G/DL (ref 2–4)
GLUCOSE SERPL-MCNC: 141 MG/DL (ref 65–100)
GLUCOSE UR STRIP.AUTO-MCNC: NEGATIVE MG/DL
HCT VFR BLD AUTO: 48.8 % (ref 36.6–50.3)
HGB BLD-MCNC: 17.3 G/DL (ref 12.1–17)
HGB UR QL STRIP: NEGATIVE
HYALINE CASTS URNS QL MICRO: >20 /LPF (ref 0–5)
IMM GRANULOCYTES # BLD: 0 K/UL (ref 0–0.04)
IMM GRANULOCYTES NFR BLD AUTO: 0 % (ref 0–0.5)
KETONES UR QL STRIP.AUTO: NEGATIVE MG/DL
LEUKOCYTE ESTERASE UR QL STRIP.AUTO: NEGATIVE
LYMPHOCYTES # BLD: 3 K/UL (ref 0.8–3.5)
LYMPHOCYTES NFR BLD: 40 % (ref 12–49)
MCH RBC QN AUTO: 33.1 PG (ref 26–34)
MCHC RBC AUTO-ENTMCNC: 35.5 G/DL (ref 30–36.5)
MCV RBC AUTO: 93.5 FL (ref 80–99)
METHADONE UR QL: NEGATIVE
MONOCYTES # BLD: 1.1 K/UL (ref 0–1)
MONOCYTES NFR BLD: 15 % (ref 5–13)
MUCOUS THREADS URNS QL MICRO: ABNORMAL /LPF
NEUTS SEG # BLD: 3.1 K/UL (ref 1.8–8)
NEUTS SEG NFR BLD: 42 % (ref 32–75)
NITRITE UR QL STRIP.AUTO: NEGATIVE
NRBC # BLD: 0 K/UL (ref 0–0.01)
NRBC BLD-RTO: 0 PER 100 WBC
OPIATES UR QL: NEGATIVE
PCP UR QL: NEGATIVE
PH UR STRIP: 6 [PH]
PLATELET # BLD AUTO: 293 K/UL (ref 150–400)
PMV BLD AUTO: 10.6 FL (ref 8.9–12.9)
POTASSIUM SERPL-SCNC: 3.6 MMOL/L (ref 3.5–5.1)
PROT SERPL-MCNC: 9.9 G/DL (ref 6.4–8.2)
PROT UR STRIP-MCNC: 30 MG/DL
RBC # BLD AUTO: 5.22 M/UL (ref 4.1–5.7)
RBC #/AREA URNS HPF: ABNORMAL /HPF (ref 0–5)
SALICYLATES SERPL-MCNC: <1.7 MG/DL (ref 2.8–20)
SODIUM SERPL-SCNC: 139 MMOL/L (ref 136–145)
SP GR UR REFRACTOMETRY: 1.03
UROBILINOGEN UR QL STRIP.AUTO: 0.2 EU/DL
WBC # BLD AUTO: 7.4 K/UL (ref 4.1–11.1)
WBC URNS QL MICRO: ABNORMAL /HPF (ref 0–4)

## 2018-04-18 PROCEDURE — 90791 PSYCH DIAGNOSTIC EVALUATION: CPT

## 2018-04-18 PROCEDURE — 65220000003 HC RM SEMIPRIVATE PSYCH

## 2018-04-18 PROCEDURE — 80053 COMPREHEN METABOLIC PANEL: CPT | Performed by: PHYSICIAN ASSISTANT

## 2018-04-18 PROCEDURE — 80307 DRUG TEST PRSMV CHEM ANLYZR: CPT | Performed by: PHYSICIAN ASSISTANT

## 2018-04-18 PROCEDURE — 36415 COLL VENOUS BLD VENIPUNCTURE: CPT | Performed by: PHYSICIAN ASSISTANT

## 2018-04-18 PROCEDURE — 74011250637 HC RX REV CODE- 250/637: Performed by: PSYCHIATRY & NEUROLOGY

## 2018-04-18 PROCEDURE — 36415 COLL VENOUS BLD VENIPUNCTURE: CPT | Performed by: PSYCHIATRY & NEUROLOGY

## 2018-04-18 PROCEDURE — 81001 URINALYSIS AUTO W/SCOPE: CPT | Performed by: PHYSICIAN ASSISTANT

## 2018-04-18 PROCEDURE — 99284 EMERGENCY DEPT VISIT MOD MDM: CPT

## 2018-04-18 PROCEDURE — 84443 ASSAY THYROID STIM HORMONE: CPT | Performed by: PSYCHIATRY & NEUROLOGY

## 2018-04-18 PROCEDURE — 85025 COMPLETE CBC W/AUTO DIFF WBC: CPT | Performed by: PHYSICIAN ASSISTANT

## 2018-04-18 PROCEDURE — 83036 HEMOGLOBIN GLYCOSYLATED A1C: CPT | Performed by: PSYCHIATRY & NEUROLOGY

## 2018-04-18 RX ORDER — OLANZAPINE 5 MG/1
5 TABLET ORAL
Status: DISCONTINUED | OUTPATIENT
Start: 2018-04-18 | End: 2018-04-19 | Stop reason: HOSPADM

## 2018-04-18 RX ORDER — LORAZEPAM 2 MG/1
4 TABLET ORAL
Status: DISCONTINUED | OUTPATIENT
Start: 2018-04-18 | End: 2018-04-19 | Stop reason: HOSPADM

## 2018-04-18 RX ORDER — LORAZEPAM 2 MG/1
2 TABLET ORAL
Status: DISCONTINUED | OUTPATIENT
Start: 2018-04-18 | End: 2018-04-19 | Stop reason: HOSPADM

## 2018-04-18 RX ORDER — BENZTROPINE MESYLATE 2 MG/1
2 TABLET ORAL
Status: DISCONTINUED | OUTPATIENT
Start: 2018-04-18 | End: 2018-04-19 | Stop reason: HOSPADM

## 2018-04-18 RX ORDER — ZOLPIDEM TARTRATE 10 MG/1
10 TABLET ORAL
Status: DISCONTINUED | OUTPATIENT
Start: 2018-04-18 | End: 2018-04-19 | Stop reason: HOSPADM

## 2018-04-18 RX ORDER — ADHESIVE BANDAGE
30 BANDAGE TOPICAL DAILY PRN
Status: DISCONTINUED | OUTPATIENT
Start: 2018-04-18 | End: 2018-04-19 | Stop reason: HOSPADM

## 2018-04-18 RX ORDER — ACETAMINOPHEN 325 MG/1
650 TABLET ORAL
Status: DISCONTINUED | OUTPATIENT
Start: 2018-04-18 | End: 2018-04-19 | Stop reason: HOSPADM

## 2018-04-18 RX ORDER — IBUPROFEN 200 MG
1 TABLET ORAL
Status: DISCONTINUED | OUTPATIENT
Start: 2018-04-18 | End: 2018-04-19 | Stop reason: HOSPADM

## 2018-04-18 RX ORDER — BENZTROPINE MESYLATE 1 MG/ML
2 INJECTION INTRAMUSCULAR; INTRAVENOUS
Status: DISCONTINUED | OUTPATIENT
Start: 2018-04-18 | End: 2018-04-19 | Stop reason: HOSPADM

## 2018-04-18 RX ORDER — ONDANSETRON 8 MG/1
8 TABLET, ORALLY DISINTEGRATING ORAL
COMMUNITY
End: 2018-04-25 | Stop reason: ALTCHOICE

## 2018-04-18 RX ORDER — IBUPROFEN 400 MG/1
400 TABLET ORAL
Status: DISCONTINUED | OUTPATIENT
Start: 2018-04-18 | End: 2018-04-19 | Stop reason: HOSPADM

## 2018-04-18 RX ADMIN — ZOLPIDEM TARTRATE 10 MG: 10 TABLET ORAL at 23:52

## 2018-04-18 NOTE — MR AVS SNAPSHOT
[address] [facility] [address] [place] [address] 
[phone] Patient: Kyle Abdi MRN: [id number] WLV:90[id number] Visit Information Date & Time Provider Department Dept. Phone Encounter #  
 4/18/2018  4:35 PM Jensen Ramirez [facility] Internal Medicine [phone] [id number] Follow-up Instructions Return in about 1 week (around 4/25/2018). Your Appointments 11/21/2018  3:50 PM  
Complete Physical with Jensen Ramirez MD  
[facility] Internal Medicine [facility]) Appt Note: CPE (11/20/17)  
 [address] [facility] [address] [facility] [address]  
Sherita [facility] [address] [address] [place] [address] Upcoming Health Maintenance Date Due DTaP/Tdap/Td series (2 - Td) 11/20/2027 Allergies as of 4/18/2018  Review Complete On: 4/18/2018 By: Jensen Ramriez MD  
 No Known Allergies Current Immunizations  Reviewed on 11/20/2017 Name Date Influenza Vaccine 1/6/2017, 10/31/2013 Td 1/1/2010 Tdap 11/20/2017 Not reviewed this visit You Were Diagnosed With   
  
 Codes Comments Severe major depression, single episode ([facility]    -  Primary ICD-10-CM: F32.2 ICD-9-CM: 296.23 Vitals BP Pulse Temp Resp Height(growth percentile) Weight(growth percentile) (!) 132/99 (BP 1 Location: Right arm, BP Patient Position: Sitting) 97 98.5 °F (36.9 °C) (Oral) 18 6' 1\" (1.854 m) 239 lb 9.6 oz (108.7 kg) SpO2 BMI Smoking Status 98% 31.61 kg/m2 Never Smoker BMI and BSA Data Body Mass Index Body Surface Area  
 31.61 kg/m 2 2.37 m 2 Preferred Pharmacy Pharmacy Name Phone [phone] [phone] Your Updated Medication List  
  
   
This list is accurate as of 4/18/18  5:44 PM.  Always use your most recent med list.  
  
  
  
  
 escitalopram oxalate 10 mg tablet Commonly known as:  Adwoa Grey Take 1 Tab by mouth daily. levothyroxine 75 mcg tablet Commonly known as:  SYNTHROID Take 1 Tab by mouth Daily (before breakfast). Indications: Hashimoto Thyroiditis  
  
 losartan-hydroCHLOROthiazide 50-12.5 mg per tablet Commonly known as:  HYZAAR  
TAKE 1 TABLET BY MOUTH EVERY DAY We Performed the Following REFERRAL TO EMERGENCY DEPARTMENT [HJT123 Custom] Follow-up Instructions Return in about 1 week (around 4/25/2018). Referral Information Referral ID Referred By Referred To  
  
 [id number] MIAH DENT Not Available Visits Status Start Date End Date 1 New Request 4/18/18 4/18/19 If your referral has a status of pending review or denied, additional information will be sent to support the outcome of this decision. Introducing [facility] & HEALTH SERVICES! Dear Leighton Zepeda: Thank you for requesting a [employer] account. Our records indicate that you already have an active [employer] account. You can access your account anytime at [email or web address] Did you know that you can access your hospital and ER discharge instructions at any time in [employer]? You can also review all of your test results from your hospital stay or ER visit. Additional Information If you have questions, please visit the Frequently Asked Questions section of the [employer] website at [email or web address] Remember, [employer] is NOT to be used for urgent needs. For medical emergencies, dial 911. Now available from your iPhone and Android! Please provide this summary of care documentation to your next provider. Your primary care clinician is listed as MIAH DENT. If you have any questions after today's visit, please call [phone].

## 2018-04-18 NOTE — PROGRESS NOTES
Admission Medication Reconciliation:    Information obtained from: Wife, RX Query    Significant PMH/Disease States:   Past Medical History:   Diagnosis Date    Hypertension     Hypothyroid     Psychiatric disorder     depression    Sleep apnea        Chief Complaint for this Admission:  Depression    Allergies:  Review of patient's allergies indicates no known allergies. Prior to Admission Medications:   Prior to Admission Medications   Prescriptions Last Dose Informant Patient Reported? Taking?   escitalopram oxalate (LEXAPRO) 10 mg tablet 2018 at Unknown time  No Yes   Sig: Take 1 Tab by mouth daily. levothyroxine (SYNTHROID) 75 mcg tablet 2018 at Unknown time  No Yes   Sig: Take 1 Tab by mouth Daily (before breakfast). Indications: Hashimoto Thyroiditis   losartan-hydroCHLOROthiazide (HYZAAR) 50-12.5 mg per tablet 2018 at Unknown time  No Yes   Sig: TAKE 1 TABLET BY MOUTH EVERY DAY   ondansetron (ZOFRAN ODT) 8 mg disintegrating tablet 3/18/2018 at Unknown time  Yes Yes   Sig: Take 8 mg by mouth every eight (8) hours as needed for Nausea. Facility-Administered Medications: None         Comments/Recommendations: Patient is weeping and stating \"I feel like such a burden, everything I do is wrong. \" Wife states his brother  of heroin overdose last year \"and he hasn't been the same since. \" During interview I could smell alcohol on his breath--he vigorously denies drinking but wife confirms that he has resumed drinking liquor. \"I have been through this with him before. \"  Updated RN and MD, strongly recommended UDS in this patient who is intoxicated and depressed. Added:  1. Ondansetron    Thank you for allowing me to participate in the care of your patient. Kenzie HamiltonD, RN #[id number]    Lexapro was a recent addition, per wife. In light of his worsening depressive sx, consider that drug may be causative.

## 2018-04-18 NOTE — ED TRIAGE NOTES
Pt complains of feeling very depressed, denies SI but has made comments otherwise to family member. Pt is anxious and tearful in triage.

## 2018-04-18 NOTE — BSMART NOTE
Comprehensive Assessment Form Part 1      Section I - Disposition    Axis I - Major depressive disorder   Alcohol abuse   Axis II - Deferred  Axis III -   Past Medical History:   Diagnosis Date    Hypertension     Hypothyroid     Psychiatric disorder     depression    Sleep apnea      Axis IV - grief, substance use, family dynamics  [place] V - 39      The Medical Doctor to Psychiatrist conference was not completed. The Medical Doctor is in agreement with Psychiatrist disposition because of (reason) patient is a voluntary admission. The plan is admission to [facility] [place] [facility].  The on-call Psychiatrist consulted was Dr. Franco Homans. The admitting Psychiatrist will be Dr. Franco Homans. The admitting Diagnosis is MDD. The Payor source is [employer]. Section II - Integrated Summary  Summary:  Patient is a 30yo male who presents to ER after being seen by his doctor for depression and anxiety. He is very tearful and reports increased stress and depression that started to become overwhelming 7 months ago when his brother  from a heroin overdose. Patient denies homicidal ideation and denies suicidal but does state his family would be better off without him because of how he has been. He reports increased stress due to medical issues including thyroid and sleep apnea. He reports sleeping but not feeling rested and it was found he has sleep apnea and has been unable to get his C-pap machine so far. He reports being tired and falling asleep on the couch at times and has not been able to participate in family activities as much due to depression and sleep. Patient reports self medicating with alcohol from September to December but has not drank until yesterday at lunch. He denied drinking today but was found to have a BAL of 321. Patient reports that he started lexapro 2 weeks ago but it has made his anxiety and depression worse not better. Patient reports having a 1yo and a wife he needs to get better for.  Patient reports having a counselor names Chyrel Runner with [facility] and has an appointment tomorrow at 1:30pm but concern for patient's safety has been increasing per wife and PCP sent him to the hospital for possible admission. Family and PCP also note patient has been davis as well. The patienthas demonstrated mental capacity to provide informed consent. The information is given by the patient, spouse/SO and relative(s). The Chief Complaint is mental health problem. The Precipitant Factors are death of problem and family conflict. Previous Hospitalizations: no  The patient has not previously been in restraints. Current Psychiatrist and/or  is none. Lethality Assessment:    The potential for suicide noted by the following: ideation . The potential for homicide is not noted. The patient has not been a perpetrator of sexual or physical abuse. There are not pending charges. The patient is not felt to be at risk for self harm or harm to others. The attending nurse was advised that security has not been notified. Section III - Psychosocial  The patient's overall mood and attitude is tearful and depressed. Feelings of helplessness and hopelessness are observed by verbal report. Generalized anxiety is observed by  By verbal report. Panic is not observed. Phobias are not observed. Obsessive compulsive tendencies are not observed. Section IV - Mental Status Exam  The patient's appearance shows no evidence of impairment. The patient's behavior shows poor eye contact. The patient is oriented to time, place, person and situation. The patient's speech shows no evidence of impairment. The patient's mood is depressed, is withdrawn, is sad and displays anhedonia. The range of affect is labile. The patient's thought content demonstrates no evidence of impairment. The thought process shows no evidence of impairment. The patient's perception shows no evidence of impairment.  The patient's memory shows no evidence of impairment. The patient's appetite shows no evidence of impairment. The patient's sleep has evidence of insomnia. The patient shows little insight. The patient's judgement is psychologically impaired. Section V - Substance Abuse  The patient is using substances. The patient is using alcohol for unknown with last use on today. The patient has experienced the following withdrawal symptoms: N/A. Section VI - Living Arrangements  The patient is . The spouses approximate age is 35 and appears to be in good health. The patient lives with a spouse and with a child/children. The patient has one child age 3. The patient does plan to return home upon discharge. The patient does not have legal issues pending. The patient's source of income comes from employment. Episcopal and cultural practices have not been voiced at this time. The patient's greatest support comes from wife and mother-in-law and this person will be involved with the treatment. The patient has not been in an event described as horrible or outside the realm of ordinary life experience either currently or in the past.  The patient has not been a victim of sexual/physical abuse. Section VII - Other Areas of Clinical Concern  The highest grade achieved is not assessed with the overall quality of school experience being described as not assessed. The patient is currently employed and speaks [place] as a primary language. The patient has no communication impairments affecting communication. The patient's preference for learning can be described as: can read and write adequately.   The patient's hearing is normal.  The patient's vision is normal.      Rosanne Bro [facility]

## 2018-04-18 NOTE — IP AVS SNAPSHOT
[address] 
[phone] Patient: Kwaku Da Silva MRN: [id number] GERTRUDIS:[birth year] About your hospitalization You were admitted on:  April 18, 2018 You last received care in the:  [address] [place] [address] You were discharged on:  April 19, 2018 Why you were hospitalized Your primary diagnosis was:  Suicidal Ideation Your diagnoses also included:  Alcohol Abuse Follow-up Information Follow up With Details Comments Contact Info Humberto Brothers MD On 4/25/2018 You have a 4:20pm hospital discharge follow-up appointment with your primary care provider. [address] [facility] [address] [facility] 
[phone] Luba Orourke On 4/26/2018 You have a 12:00pm appointment for individual therapy. [address] [facility] [address] [facility], [facility] [address] [place], [facility] [address] [facility] 
[phone] Discharge Orders None A check emmanuel indicates which time of day the medication should be taken. My Medications CONTINUE taking these medications Instructions Each Dose to Equal  
 Morning Noon Evening Bedtime  
 escitalopram oxalate 10 mg tablet Commonly known as:  Martha Bread Your next dose is:  Tomorrow at 9am  
   
 Take 1 Tab by mouth daily. 10 mg  
    
  
   
   
   
  
 levothyroxine 75 mcg tablet Commonly known as:  SYNTHROID Your next dose is:  Tomorrow before breakfast  
   
 Take 1 Tab by mouth Daily (before breakfast). Indications: Hashimoto Thyroiditis 75 mcg  
    
  
   
   
   
  
 losartan-hydroCHLOROthiazide 50-12.5 mg per tablet Commonly known as:  HYZAAR Your next dose is:  Tomorrow at [address] 1 TABLET BY MOUTH EVERY DAY  
     
  
   
   
   
  
 ondansetron 8 mg disintegrating tablet Commonly known as:  ZOFRAN ODT Take 8 mg by mouth every eight (8) hours as needed for Nausea. 8 mg Discharge Instructions DISCHARGE SUMMARY 
 
NAME:Stone Trent : 1985 MRN: [id number] The patient Tha Love exhibits the ability to control behavior in a less restrictive environment. Patient's level of functioning is improving. No assaultive/destructive behavior has been observed for the past 24 hours. No suicidal/homicidal threat or behavior has been observed for the past 24 hours. There is no evidence of serious medication side effects. Patient has not been in physical or protective restraints for at least the past 24 hours. If weapons involved, how are they secured? No weapons involved. Is patient aware of and in agreement with discharge plan? Yes Arrangements for medication:  No prescriptions written. Referral for substance abuse treatment? Dr. Angelique Gil Referral for smoking cessation needed? Patient is not a smoker/Not applicable. Copy of discharge instructions to provider?:  Antonia Min Arrangements for transportation home:  Wife to . Keep all follow up appointments as scheduled, continue to take prescribed medications per physician instructions. Mental health crisis number:  660 or your local mental health crisis line number at [phone]. DISCHARGE SUMMARY from Nurse PATIENT INSTRUCTIONS: 
What to do at Home: 
Recommended activity: Activity as tolerated, If you experience any of the following symptoms thoughts of harming self feeling overwhelmed with anxiety, sadness or hopelessness, please follow up with your therapist and PCP. If you can not reach them or their office immediately call your local crisis number. *  Please give a list of your current medications to your Primary Care Provider. *  Please update this list whenever your medications are discontinued, doses are 
    changed, or new medications (including over-the-counter products) are added. *  Please carry medication information at all times in case of emergency situations. These are general instructions for a healthy lifestyle: No smoking/ No tobacco products/ Avoid exposure to second hand smoke Surgeon General's Warning:  Quitting smoking now greatly reduces serious risk to your health. Obesity, smoking, and sedentary lifestyle greatly increases your risk for illness A healthy diet, regular physical exercise & weight monitoring are important for maintaining a healthy lifestyle You may be retaining fluid if you have a history of heart failure or if you experience any of the following symptoms:  Weight gain of 3 pounds or more overnight or 5 pounds in a week, increased swelling in our hands or feet or shortness of breath while lying flat in bed. Please call your doctor as soon as you notice any of these symptoms; do not wait until your next office visit. Recognize signs and symptoms of STROKE: 
 
F-face looks uneven A-arms unable to move or move unevenly S-speech slurred or non-existent T-time-call 911 as soon as signs and symptoms begin-DO NOT go Back to bed or wait to see if you get better-TIME IS BRAIN. Warning Signs of HEART ATTACK Call 911 if you have these symptoms: 
? Chest discomfort. Most heart attacks involve discomfort in the center of the chest that lasts more than a few minutes, or that goes away and comes back. It can feel like uncomfortable pressure, squeezing, fullness, or pain. ? Discomfort in other areas of the upper body. Symptoms can include pain or discomfort in one or both arms, the back, neck, jaw, or stomach. ? Shortness of breath with or without chest discomfort. ? Other signs may include breaking out in a cold sweat, nausea, or lightheadedness. Don't wait more than five minutes to call [address]! Fast action can save your life.  Calling 911 is almost always the fastest way to get lifesaving treatment. Emergency Medical Services staff can begin treatment when they arrive  up to an hour sooner than if someone gets to the hospital by car. The discharge information has been reviewed with the patient. The patient verbalized understanding. Discharge medications reviewed with the patient and appropriate educational materials and side effects teaching were provided. ___________________________________________________________________________________________________________________________________ [facility] Announcement We are excited to announce that we are making your provider's discharge notes available to you in [facility]. You will see these notes when they are completed and signed by the physician that discharged you from your recent hospital stay. If you have any questions or concerns about any information you see in [facility], please call the Health Information Department where you were seen or reach out to your Primary Care Provider for more information about your plan of care. Introducing [facility]! Dear Ford Mercedes: Thank you for requesting a [facility] account. Our records indicate that you already have an active [facility] account. You can access your account anytime at [email or web address] Did you know that you can access your hospital and ER discharge instructions at any time in [facility]? You can also review all of your test results from your hospital stay or ER visit. Additional Information If you have questions, please visit the Frequently Asked Questions section of the [facility] website at [email or web address] Remember, [facility] is NOT to be used for urgent needs. For medical emergencies, dial 911. Now available from your iPhone and Android! Introducing Hany Malin As a [employer] patient, I wanted to make you aware of our electronic visit tool called Hany Malin. [employer] 24/7 allows you to connect within minutes with a medical provider 24 hours a day, seven days a week via a mobile device or tablet or logging into a secure website from your computer. You can access [employer] from anywhere in the [place]. A virtual visit might be right for you when you have a simple condition and feel like you just dont want to get out of bed, or cant get away from work for an appointment, when your regular [employer] provider is not available (evenings, weekends or holidays), or when youre out of town and need minor care. Electronic visits cost only $49 and if the [employer] 24/7 provider determines a prescription is needed to treat your condition, one can be electronically transmitted to a nearby pharmacy*. Please take a moment to enroll today if you have not already done so. The enrollment process is free and takes just a few minutes. To enroll, please download the [employer] 24/7 luis felipe to your tablet or phone, or visit [email or web address] to enroll on your computer. And, as an [address] [place] [address] patient with a [employer] account, the results of your visits will be scanned into your electronic medical record and your primary care provider will be able to view the scanned results. We urge you to continue to see your regular [employer] provider for your ongoing medical care. And while your primary care provider may not be the one available when you seek a [employer] virtual visit, the peace of mind you get from getting a real diagnosis real time can be priceless. For more information on [employer], view our Frequently Asked Questions (FAQs) at [email or web address]. Sincerely, 
 
Blossom Damon MD 
Chief Medical Officer Derek Bob *:  certain medications cannot be prescribed via [employer] Providers Seen During Your Hospitalization Provider Specialty Primary office phone Gilberto Ray MD Emergency Medicine [phone] Celine Stacy MD Psychiatry [phone] Jean Schumacher MD Psychiatry [phone] Your Primary Care Physician (PCP) Primary Care Physician Office Phone Office Fax Jaimie Rhys [phone] You are allergic to the following No active allergies Recent Documentation Height Weight BMI Smoking Status 1.829 m 107.8 kg 32.22 kg/m2 Never Smoker Emergency Contacts Name Discharge Info Relation Home Work Mobile Marilee Marroquin DISCHARGE CAREGIVER [3] Spouse [3] [phone] Patient Belongings The following personal items are in your possession at time of discharge: 
  Dental Appliances: None  Visual Aid: None      Home Medications: None   Jewelry: None  Clothing: Footwear, Pants, Shirt, Socks    Other Valuables: [employer] Please provide this summary of care documentation to your next provider. Signatures-by signing, you are acknowledging that this After Visit Summary has been reviewed with you and you have received a copy. Patient Signature:  ____________________________________________________________ Date:  ____________________________________________________________  
  
Etta Ramirez Provider Signature:  ____________________________________________________________ Date:  ____________________________________________________________

## 2018-04-18 NOTE — PROGRESS NOTES
HISTORY OF PRESENT ILLNESS  Reed Clark is a 27 y.o. male. HPI Christopher Thomas is seen today accompanied by his wife and a close friend from work for follow up of depression and hypothyroidism. He is doing poorly. His depression has worsened since his last visit. He has been on Lexapro. He has become despondent. His wife told me this morning that he had some suicidal ideation but had not made any plans. He has had some irregular behavioral patterns at work. According to family and friend, he appears to be having some mood swings. They wonder if, in fact, he may have bipolar disorder. Christopher Thomas is distraught today and answers a few questions, but mainly is quiet and tearful. Apparently, he denies any active suicidal plans. He does not want to go to the ER for urgent evaluation despite my recommendation. In the end, he agrees. I have outlined a plan for Chritsopher Thomas and his family if he does not require admission tonight, then we will need ASAP psychiatry evaluation. He could call the psychiatrist that are covered in his insurance panel as well as list the help of his counselor. They actually have an appointment with the counselor tomorrow. Other medical problems include a new diagnosis of thyroid dysfunction, low thyroid. Labs are due. He also has been diagnosed with sleep apnea and the CPAP apparatus is pending. He has a very poor sleep pattern at this time. We counseled for 25 minutes regarding depression, urgent treatment requirement at the ER as well as plans for further evaluation, a 25-minute visit overall, > 50% of the visit was spent in counseling. Review of Systems   Psychiatric/Behavioral: Positive for depression and suicidal ideas. The patient is nervous/anxious and has insomnia. Physical Exam   Constitutional: He appears distressed. Neurological: He is alert. Psychiatric:   Distraught, tearful   Nursing note and vitals reviewed.       ASSESSMENT and PLAN  Diagnoses and all orders for this visit: 1.  Severe major depression, single episode ([facility] [address]  -     REFERRAL TO EMERGENCY DEPARTMENT- ASAP    Plan was reviewed with patient and family, understanding expressed

## 2018-04-18 NOTE — IP AVS SNAPSHOT
[address] [facility] [address] [place] [address] 
[phone] Patient: Kwaku Da Silva MRN: [id number] YASMIN:[birth year] A check emmanuel indicates which time of day the medication should be taken. My Medications CONTINUE taking these medications Instructions Each Dose to Equal  
 Morning Noon Evening Bedtime  
 escitalopram oxalate 10 mg tablet Commonly known as:  Martha Bread Your next dose is:  Tomorrow at 9am  
   
 Take 1 Tab by mouth daily. 10 mg  
    
  
   
   
   
  
 levothyroxine 75 mcg tablet Commonly known as:  SYNTHROID Your next dose is:  Tomorrow before breakfast  
   
 Take 1 Tab by mouth Daily (before breakfast). Indications: Hashimoto Thyroiditis 75 mcg  
    
  
   
   
   
  
 losartan-hydroCHLOROthiazide 50-12.5 mg per tablet Commonly known as:  HYZAAR Your next dose is:  Tomorrow at [address] 1 TABLET BY MOUTH EVERY DAY  
     
  
   
   
   
  
 ondansetron 8 mg disintegrating tablet Commonly known as:  ZOFRAN ODT Take 8 mg by mouth every eight (8) hours as needed for Nausea. 8 mg

## 2018-04-19 VITALS
BODY MASS INDEX: 32.18 KG/M2 | TEMPERATURE: 98 F | OXYGEN SATURATION: 96 % | HEIGHT: 72 IN | WEIGHT: 237.6 LBS | SYSTOLIC BLOOD PRESSURE: 127 MMHG | RESPIRATION RATE: 14 BRPM | HEART RATE: 111 BPM | DIASTOLIC BLOOD PRESSURE: 95 MMHG

## 2018-04-19 PROBLEM — F10.10 ALCOHOL ABUSE: Status: ACTIVE | Noted: 2018-04-19

## 2018-04-19 PROBLEM — R45.851 SUICIDAL IDEATION: Status: RESOLVED | Noted: 2018-04-18 | Resolved: 2018-04-19

## 2018-04-19 LAB
EST. AVERAGE GLUCOSE BLD GHB EST-MCNC: 105 MG/DL
HBA1C MFR BLD: 5.3 % (ref 4.2–6.3)
TSH SERPL DL<=0.05 MIU/L-ACNC: 2.87 UIU/ML (ref 0.36–3.74)

## 2018-04-19 PROCEDURE — 74011250637 HC RX REV CODE- 250/637: Performed by: INTERNAL MEDICINE

## 2018-04-19 RX ORDER — LOSARTAN POTASSIUM 50 MG/1
50 TABLET ORAL DAILY
Status: DISCONTINUED | OUTPATIENT
Start: 2018-04-19 | End: 2018-04-19 | Stop reason: HOSPADM

## 2018-04-19 RX ORDER — LEVOTHYROXINE SODIUM 75 UG/1
75 TABLET ORAL
Status: DISCONTINUED | OUTPATIENT
Start: 2018-04-19 | End: 2018-04-19 | Stop reason: HOSPADM

## 2018-04-19 RX ORDER — HYDROCHLOROTHIAZIDE 25 MG/1
12.5 TABLET ORAL DAILY
Status: DISCONTINUED | OUTPATIENT
Start: 2018-04-19 | End: 2018-04-19 | Stop reason: HOSPADM

## 2018-04-19 RX ADMIN — LOSARTAN POTASSIUM 50 MG: 50 TABLET ORAL at 09:06

## 2018-04-19 RX ADMIN — HYDROCHLOROTHIAZIDE 12.5 MG: 25 TABLET ORAL at 09:05

## 2018-04-19 RX ADMIN — LEVOTHYROXINE SODIUM 75 MCG: 75 TABLET ORAL at 09:05

## 2018-04-19 NOTE — ED PROVIDER NOTES
HPI Comments: 27 y.o. male with past medical history significant for depression presents with complaints of suicidal ideations. The pt states that he was started on Lexapro a couple of weeks ago. States that he has not been sleeping well over the last few days. Pt also recently diagnosed with sleep apnea. Denies homicidal ideations. The pt does not have a plan today. Denies visual or auditory hallucinations. Denies ETOH or illicit drug use. There are no other acute medical complaints at this time. PCP: MD Jigar Pizarro PA-C    Patient is a 27 y.o. male presenting with depression. Depression    Pertinent negatives include no numbness. Past Medical History:   Diagnosis Date    Hypertension     Hypothyroid     Psychiatric disorder     depression    Sleep apnea        Past Surgical History:   Procedure Laterality Date    HX ACL RECONSTRUCTION  2008    HX HEENT      wisdom teeth         Family History:   Problem Relation Age of Onset    Diabetes Father     Hypertension Father     Thyroid Disease Father     Anemia Father      multiple myeloma    Rashes/Skin Problems Mother     Eczema Mother     Hypertension Maternal Grandmother     Arthritis-osteo Paternal Grandmother     Alcohol abuse Brother      heroin overdose       Social History     Social History    Marital status:      Spouse name: N/A    Number of children: 0    Years of education: N/A     Occupational History    Not on file. Social History Main Topics    Smoking status: Never Smoker    Smokeless tobacco: Former User     Quit date: 11/20/2010    Alcohol use No    Drug use: No    Sexual activity: Yes     Partners: Female     Other Topics Concern    Not on file     Social History Narrative         ALLERGIES: Review of patient's allergies indicates no known allergies. Review of Systems   Constitutional: Negative for activity change, appetite change, diaphoresis and fever.    HENT: Negative for ear discharge, ear pain, facial swelling, rhinorrhea, sore throat, tinnitus, trouble swallowing and voice change. Eyes: Negative for photophobia, pain, discharge, redness and visual disturbance. Respiratory: Negative for cough, chest tightness, shortness of breath, wheezing and stridor. Cardiovascular: Negative for chest pain and palpitations. Gastrointestinal: Negative for abdominal pain, constipation, diarrhea, nausea and vomiting. Endocrine: Negative for polydipsia and polyuria. Genitourinary: Negative for dysuria, flank pain and hematuria. Musculoskeletal: Negative for arthralgias, back pain and myalgias. Skin: Negative for color change and rash. Neurological: Negative for dizziness, syncope, speech difficulty, light-headedness and numbness. Psychiatric/Behavioral: Positive for behavioral problems and depression. Vitals:    04/18/18 1806   BP: 121/75   Pulse: (!) 120   Resp: 18   Temp: 98.2 °F (36.8 °C)   SpO2: 96%   Weight: 108.9 kg (240 lb 1.6 oz)   Height: 6' (1.829 m)            Physical Exam   Constitutional: He is oriented to person, place, and time. He appears well-developed and well-nourished. No distress. HENT:   Head: Normocephalic and atraumatic. Eyes: Conjunctivae are normal. Pupils are equal, round, and reactive to light. Neck: Normal range of motion. Neck supple. Cardiovascular: Regular rhythm and normal heart sounds. Tachycardia. Pulmonary/Chest: Effort normal and breath sounds normal. No respiratory distress. He has no wheezes. Abdominal: Soft. Bowel sounds are normal. He exhibits no distension. There is no tenderness. Musculoskeletal: Normal range of motion. Neurological: He is alert and oriented to person, place, and time. Skin: Skin is warm. He is not diaphoretic.         MDM  Number of Diagnoses or Management Options  Depression, unspecified depression type:   Suicidal ideations:   Diagnosis management comments: Pt evaluated by [facility] and will be admitted to psychiatry.   Rina Garcia PA-C        ED Course       Procedures

## 2018-04-19 NOTE — PROGRESS NOTES
Admission Medication Reconciliation:    Information obtained from:  [facility] (insurance data)    Comments/Recommendations: Updated PTA meds/reviewed patient's allergies. 1)  No changes made to PTA med list       Significant PMH/Disease States:   Past Medical History:   Diagnosis Date    Hypertension     Hypothyroid     Psychiatric disorder     depression    Sleep apnea      Chief Complaint for this Admission:    Chief Complaint   Patient presents with    Depression     Allergies:  Review of patient's allergies indicates no known allergies. Prior to Admission Medications:   Prior to Admission Medications   Prescriptions Last Dose Informant Patient Reported? Taking?   escitalopram oxalate (LEXAPRO) 10 mg tablet 4/18/2018 at Unknown time  No Yes   Sig: Take 1 Tab by mouth daily. levothyroxine (SYNTHROID) 75 mcg tablet 4/18/2018 at Unknown time  No Yes   Sig: Take 1 Tab by mouth Daily (before breakfast). Indications: Hashimoto Thyroiditis   losartan-hydroCHLOROthiazide (HYZAAR) 50-12.5 mg per tablet 4/18/2018 at Unknown time  No Yes   Sig: TAKE 1 TABLET BY MOUTH EVERY DAY   ondansetron (ZOFRAN ODT) 8 mg disintegrating tablet 3/18/2018 at Unknown time  Yes Yes   Sig: Take 8 mg by mouth every eight (8) hours as needed for Nausea.       Facility-Administered Medications: None     Cricket Watt, PharmD, BCPP, [facility] Specialist, [facility]

## 2018-04-19 NOTE — BH NOTES
GROUP THERAPY PROGRESS NOTE    Cabrera Wyatt did not participate in a 75 minute morning Process Group on the General Unit with a focus identifying feelings, planning for the day, and learning more about DBT concepts on \"Emotion Regulation. \"

## 2018-04-19 NOTE — H&P
INITIAL PSYCHIATRIC EVALUATION            IDENTIFICATION:    Patient Name  Nazanin Vargas   Date of Birth 1985   [facility] [id number]   Medical Record Number  [id number]      Age  27 y.o. PCP Torey Olivia MD   Admit date:  4/18/2018    Room Number  725/01  @ [facility]   Date of Service  4/19/2018            HISTORY         REASON FOR HOSPITALIZATION:  CC: \"alcohol abus e\"  \"Pt admitted under a voluntary basis for alcohol abuse and bereavement  proving to be an imminent danger to self and others and an inability to care for self. HISTORY OF PRESENT ILLNESS:    The patient, Nazanin Vargas, is a 27 y.o. WHITE [place]  male with a past psychiatric history significant for alcohol dependence , who presents at this time with complaints of (and/or evidence of) the following emotional symptoms: depression. Additional symptomatology include anxiety. The above symptoms have been present for 2 days . These symptoms are of severe severity. These symptoms are intermittent/ fleeting in nature. The patient's condition has been precipitated by relapsing  Into alcohol and psychosocial stressors (recent deaths  ). Patient's condition made worse by  alcohol use as well as eatment noncompliance. UDS: negative; BAL=0. ALLERGIES: No Known Allergies   MEDICATIONS PRIOR TO ADMISSION:   Prescriptions Prior to Admission   Medication Sig    ondansetron (ZOFRAN ODT) 8 mg disintegrating tablet Take 8 mg by mouth every eight (8) hours as needed for Nausea.  escitalopram oxalate (LEXAPRO) 10 mg tablet Take 1 Tab by mouth daily.  levothyroxine (SYNTHROID) 75 mcg tablet Take 1 Tab by mouth Daily (before breakfast).  Indications: Hashimoto Thyroiditis    losartan-hydroCHLOROthiazide (HYZAAR) 50-12.5 mg per tablet TAKE 1 TABLET BY MOUTH EVERY DAY      PAST MEDICAL HISTORY:   Past Medical History:   Diagnosis Date    Hypertension     Hypothyroid     Psychiatric disorder     depression    Sleep apnea Past Surgical History:   Procedure Laterality Date    HX ACL RECONSTRUCTION  2008    HX HEENT      wisdom teeth      SOCIAL HISTORY:    Social History     Social History    Marital status:      Spouse name: N/A    Number of children: 0    Years of education: N/A     Occupational History    Not on file. Social History Main Topics    Smoking status: Never Smoker    Smokeless tobacco: Former User     Quit date: 11/20/2010    Alcohol use Yes    Drug use: No    Sexual activity: Yes     Partners: Female     Other Topics Concern    Not on file     Social History Narrative    27year old male admitted voluntarily for SI. Pt has Sleep apnea. Pt has been abusing alcohol BAL was 321. He v is followed for behavior health at [address] [place] [address]. FAMILY HISTORY:    Family History   Problem Relation Age of Onset    Diabetes Father     Hypertension Father     Thyroid Disease Father     Anemia Father      multiple myeloma    Rashes/Skin Problems Mother     Eczema Mother     Hypertension Maternal Grandmother     Arthritis-osteo Paternal Grandmother     Alcohol abuse Brother      heroin overdose       REVIEW OF SYSTEMS:   Psychological ROS: positive for - behavioral disorder  Cardiovascular ROS: no chest pain or dyspnea on exertion  Gastrointestinal ROS: no abdominal pain, change in bowel habits, or black or bloody stools  Pertinent items are noted in the History of Present Illness. All other Systems reviewed and are considered negative. MENTAL STATUS EXAM & VITALS     MENTAL STATUS EXAM (MSE):    MSE FINDINGS ARE WITHIN NORMAL LIMITS (WNL) UNLESS OTHERWISE STATED BELOW. ( ALL OF THE BELOW CATEGORIES OF THE MSE HAVE BEEN REVIEWED (reviewed 4/19/2018) AND UPDATED AS DEEMED APPROPRIATE )  General Presentation age appropriate, cooperative   Orientation oriented to time, place and person   Vital Signs  See below (reviewed 4/19/2018);  Vital Signs (BP, Pulse, & Temp) are within normal limits if not listed below. Gait and Station Stable/steady, no ataxia   Musculoskeletal System No extrapyramidal symptoms (EPS); no abnormal muscular movements or Tardive Dyskinesia (TD); muscle strength and tone are within normal limits   Language No aphasia or dysarthria   Speech:  monotone   Thought Processes logical; normal rate of thoughts; fair abstract reasoning/computation   Thought Associations goal directed   Thought Content free of delusions and free of hallucinations   Suicidal Ideations none   Homicidal Ideations none   Mood:  stable    Affect:  mood-congruent   Memory recent  fair   Memory remote:  fair   Concentration/Attention:  distractable   Fund of Knowledge average   Insight:  good   Reliability fair   Judgment:  limited          VITALS:     Patient Vitals for the past 24 hrs:   Temp Pulse Resp BP SpO2   04/19/18 0830 98 °F (36.7 °C) (!) 111 14 (!) 127/95 96 %   04/18/18 2333 - - 18 130/85 96 %   04/18/18 2036 98.2 °F (36.8 °C) 91 18 119/79 95 %   04/18/18 1806 98.2 °F (36.8 °C) (!) 120 18 121/75 96 %     Wt Readings from Last 3 Encounters:   04/18/18 107.8 kg (237 lb 9.6 oz)   04/18/18 108.7 kg (239 lb 9.6 oz)   03/22/18 110.4 kg (243 lb 6.4 oz)     Temp Readings from Last 3 Encounters:   04/19/18 98 °F (36.7 °C)   04/18/18 98.5 °F (36.9 °C) (Oral)   03/22/18 98 °F (36.7 °C) (Oral)     BP Readings from Last 3 Encounters:   04/19/18 (!) 127/95   04/18/18 (!) 132/99   03/22/18 136/86     Pulse Readings from Last 3 Encounters:   04/19/18 (!) 111   04/18/18 97   03/22/18 (!) 138            DATA     LABORATORY DATA:  Labs Reviewed   CBC WITH AUTOMATED DIFF - Abnormal; Notable for the following:        Result Value    HGB 17.3 (*)     MONOCYTES 15 (*)     ABS.  MONOCYTES 1.1 (*)     All other components within normal limits   METABOLIC PANEL, COMPREHENSIVE - Abnormal; Notable for the following:     Glucose 141 (*)     BUN 25 (*)     Calcium 8.4 (*)     ALT (SGPT) 123 (*)     AST (SGOT) 69 (*) Protein, total 9.9 (*)     Globulin 5.7 (*)     A-G Ratio 0.7 (*)     All other components within normal limits   ETHYL ALCOHOL - Abnormal; Notable for the following:     ALCOHOL(ETHYL),SERUM 321 (*)     All other components within normal limits   ACETAMINOPHEN - Abnormal; Notable for the following:     Acetaminophen level <2 (*)     All other components within normal limits   SALICYLATE - Abnormal; Notable for the following:     Salicylate level <8.1 (*)     All other components within normal limits   URINALYSIS W/MICROSCOPIC - Abnormal; Notable for the following:     Mucus 3+ (*)     Hyaline cast >20 (*)     All other components within normal limits   DRUG SCREEN, URINE   BILIRUBIN, CONFIRM   TSH 3RD GENERATION   HEMOGLOBIN A1C WITH EAG   SAMPLES BEING HELD   METABOLIC PANEL, COMPREHENSIVE   LIPID PANEL     Admission on 04/18/2018   Component Date Value Ref Range Status    WBC 04/18/2018 7.4  4.1 - 11.1 K/uL Final    RBC 04/18/2018 5.22  4.10 - 5.70 M/uL Final    HGB 04/18/2018 17.3* 12.1 - 17.0 g/dL Final    HCT 04/18/2018 48.8  36.6 - 50.3 % Final    MCV 04/18/2018 93.5  80.0 - 99.0 FL Final    MCH 04/18/2018 33.1  26.0 - 34.0 PG Final    MCHC 04/18/2018 35.5  30.0 - 36.5 g/dL Final    RDW 04/18/2018 13.6  11.5 - 14.5 % Final    PLATELET 64/22/6595 981  150 - 400 K/uL Final    MPV 04/18/2018 10.6  8.9 - 12.9 FL Final    NRBC 04/18/2018 0.0  0  WBC Final    ABSOLUTE NRBC 04/18/2018 0.00  0.00 - 0.01 K/uL Final    NEUTROPHILS 04/18/2018 42  32 - 75 % Final    LYMPHOCYTES 04/18/2018 40  12 - 49 % Final    MONOCYTES 04/18/2018 15* 5 - 13 % Final    EOSINOPHILS 04/18/2018 1  0 - 7 % Final    BASOPHILS 04/18/2018 1  0 - 1 % Final    IMMATURE GRANULOCYTES 04/18/2018 0  0.0 - 0.5 % Final    ABS. NEUTROPHILS 04/18/2018 3.1  1.8 - 8.0 K/UL Final    ABS. LYMPHOCYTES 04/18/2018 3.0  0.8 - 3.5 K/UL Final    ABS. MONOCYTES 04/18/2018 1.1* 0.0 - 1.0 K/UL Final    ABS.  EOSINOPHILS 04/18/2018 0.1 0.0 - 0.4 K/UL Final    ABS. BASOPHILS 04/18/2018 0.1  0.0 - 0.1 K/UL Final    ABS. IMM. GRANS. 04/18/2018 0.0  0.00 - 0.04 K/UL Final    DF 04/18/2018 AUTOMATED    Final    Sodium 04/18/2018 139  136 - 145 mmol/L Final    Potassium 04/18/2018 3.6  3.5 - 5.1 mmol/L Final    Chloride 04/18/2018 101  97 - 108 mmol/L Final    CO2 04/18/2018 24  21 - 32 mmol/L Final    Anion gap 04/18/2018 14  5 - 15 mmol/L Final    Glucose 04/18/2018 141* 65 - 100 mg/dL Final    BUN 04/18/2018 25* 6 - 20 MG/DL Final    Creatinine 04/18/2018 1.28  0.70 - 1.30 MG/DL Final    BUN/Creatinine ratio 04/18/2018 20  12 - 20   Final    GFR est AA 04/18/2018 >60  >60 ml/min/1.73m2 Final    GFR est non-AA 04/18/2018 >60  >60 ml/min/1.73m2 Final    Calcium 04/18/2018 8.4* 8.5 - 10.1 MG/DL Final    Bilirubin, total 04/18/2018 0.7  0.2 - 1.0 MG/DL Final    ALT (SGPT) 04/18/2018 123* 12 - 78 U/L Final    AST (SGOT) 04/18/2018 69* 15 - 37 U/L Final    Alk.  phosphatase 04/18/2018 68  45 - 117 U/L Final    Protein, total 04/18/2018 9.9* 6.4 - 8.2 g/dL Final    Albumin 04/18/2018 4.2  3.5 - 5.0 g/dL Final    Globulin 04/18/2018 5.7* 2.0 - 4.0 g/dL Final    A-G Ratio 04/18/2018 0.7* 1.1 - 2.2   Final    ALCOHOL(ETHYL),SERUM 04/18/2018 321* <10 MG/DL Final    AMPHETAMINES 04/18/2018 NEGATIVE   NEG   Final    BARBITURATES 04/18/2018 NEGATIVE   NEG   Final    BENZODIAZEPINES 04/18/2018 NEGATIVE   NEG   Final    COCAINE 04/18/2018 NEGATIVE   NEG   Final    METHADONE 04/18/2018 NEGATIVE   NEG   Final    OPIATES 04/18/2018 NEGATIVE   NEG   Final    PCP(PHENCYCLIDINE) 04/18/2018 NEGATIVE   NEG   Final    THC (TH-CANNABINOL) 04/18/2018 NEGATIVE   NEG   Final    Drug screen comment 04/18/2018 (NOTE)   Final    Acetaminophen level 04/18/2018 <2* 10 - 30 ug/mL Final    Salicylate level 37/57/5873 <1.7* 2.8 - 20.0 MG/DL Final    Color 04/18/2018 YELLOW/STRAW    Final    Appearance 04/18/2018 HAZY    Final    Specific gravity 04/18/2018 1.026    Final    pH (UA) 04/18/2018 6.0    Final    Protein 04/18/2018 30  mg/dL Final    Glucose 04/18/2018 NEGATIVE   mg/dL Final    Ketone 04/18/2018 NEGATIVE   mg/dL Final    Blood 04/18/2018 NEGATIVE     Final    Urobilinogen 04/18/2018 0.2  EU/dL Final    Nitrites 04/18/2018 NEGATIVE     Final    Leukocyte Esterase 04/18/2018 NEGATIVE     Final    WBC 04/18/2018 0-4  0 - 4 /hpf Final    RBC 04/18/2018 0-5  0 - 5 /hpf Final    Epithelial cells 04/18/2018 FEW  FEW /lpf Final    Bacteria 04/18/2018 NEGATIVE   NEG /hpf Final    Mucus 04/18/2018 3+* NEG /lpf Final    Hyaline cast 04/18/2018 >20* 0 - 5 /lpf Final    Bilirubin UA, confirm 04/18/2018 NEGATIVE   NEG   Final    TSH 04/18/2018 2.87  0.36 - 3.74 uIU/mL Final    Hemoglobin A1c 04/18/2018 5.3  4.2 - 6.3 % Final    Est. average glucose 04/18/2018 105  mg/dL Final        RADIOLOGY REPORTS:  No results found for this or any previous visit. No results found.            MEDICATIONS       ALL MEDICATIONS  Current Facility-Administered Medications   Medication Dose Route Frequency    levothyroxine (SYNTHROID) tablet 75 mcg  75 mcg Oral ACB    hydroCHLOROthiazide (HYDRODIURIL) tablet 12.5 mg  12.5 mg Oral DAILY    losartan (COZAAR) tablet 50 mg  50 mg Oral DAILY    ziprasidone (GEODON) 20 mg in sterile water (preservative free) 1 mL injection  20 mg IntraMUSCular BID PRN    OLANZapine (ZyPREXA) tablet 5 mg  5 mg Oral Q6H PRN    benztropine (COGENTIN) tablet 2 mg  2 mg Oral BID PRN    benztropine (COGENTIN) injection 2 mg  2 mg IntraMUSCular BID PRN    zolpidem (AMBIEN) tablet 10 mg  10 mg Oral QHS PRN    acetaminophen (TYLENOL) tablet 650 mg  650 mg Oral Q4H PRN    ibuprofen (MOTRIN) tablet 400 mg  400 mg Oral Q8H PRN    magnesium hydroxide (MILK OF MAGNESIA) 400 mg/5 mL oral suspension 30 mL  30 mL Oral DAILY PRN    nicotine (NICODERM CQ) 21 mg/24 hr patch 1 Patch  1 Patch TransDERmal DAILY PRN    LORazepam (ATIVAN) tablet 2 mg  2 mg Oral Q1H PRN    LORazepam (ATIVAN) tablet 4 mg  4 mg Oral Q1H PRN      SCHEDULED MEDICATIONS  Current Facility-Administered Medications   Medication Dose Route Frequency    levothyroxine (SYNTHROID) tablet 75 mcg  75 mcg Oral ACB    hydroCHLOROthiazide (HYDRODIURIL) tablet 12.5 mg  12.5 mg Oral DAILY    losartan (COZAAR) tablet 50 mg  50 mg Oral DAILY                ASSESSMENT & PLAN        The patient, Kwaku Da Silva, is a 27 y.o.  male who presents at this time for treatment of the following diagnoses:  Patient Active Hospital Problem List:     Alcohol abuse (4/19/2018)    Assessment: relapse into daily use with increased tolerance    Plan: AA/ 12 steps                 A coordinated, multidisplinary treatment team (includes the nurse, unit pharmcist,  and writer) round was conducted for this initial evaluation with the patient present. The following regarding medications was addressed during rounds with patient: Edward Clement  the risks and benefits of the proposed medication. The patient was given the opportunity to ask questions. Informed consent given to the use of the above medications. I will continue to adjust psychiatric and non-psychiatric medications (see above \"medication\" section and orders section for details) as deemed appropriate & based upon diagnoses and response to treatment. I have reviewed admission (and previous/old) labs and medical tests in the EHR and or transferring hospital documents. I will continue to order blood tests/labs and diagnostic tests as deemed appropriate and review results as they become available (see orders for details). I have reviewed old psychiatric and medical records available in the EHR. I Will order additional psychiatric records from other institutions to further elucidate the nature of patient's psychopathology and review once available.     I will gather additional collateral information from friends, family and o/p treatment team to further elucidate the nature of patient's psychopathology and baselline level of psychiatric functioning.       ESTIMATED LENGTH OF STAY:    1-2 days        STRENGTHS:  Exercising self-direction/Resourceful, Access to housing/residential stability and Interpersonal/supportive relationships (family, friends, peers)                                        SIGNED:    Heri Montilla MD  4/19/2018

## 2018-04-19 NOTE — BH NOTES
Behavioral Health Transition Record to Provider    Patient Name: Brittany Padilla  YOB: 1985  Medical Record Number: [id number]  Date of Admission: 4/18/2018  Date of Discharge: 4/19/2018    Attending Provider: Reyes Mendez MD  Discharging Provider: Reyes Mendez MD  To contact this individual call [phone] and ask the  to page. If unavailable, ask to be transferred to [facility] Provider on call. [facility] Provider will be available on call 24/7 and during holidays. Primary Care Provider: Paulie Moreno MD    No Known Allergies    Reason for Admission: Pt admitted under a voluntary basis for alcohol abuse and bereavement  proving to be an imminent danger to self and others and an inability to care for self. Admission Diagnosis: Suicidal ideation    * No surgery found *    Results for orders placed or performed during the hospital encounter of 04/18/18   CBC WITH AUTOMATED DIFF   Result Value Ref Range    WBC 7.4 4.1 - 11.1 K/uL    RBC 5.22 4.10 - 5.70 M/uL    HGB 17.3 (H) 12.1 - 17.0 g/dL    HCT 48.8 36.6 - 50.3 %    MCV 93.5 80.0 - 99.0 FL    MCH 33.1 26.0 - 34.0 PG    MCHC 35.5 30.0 - 36.5 g/dL    RDW 13.6 11.5 - 14.5 %    PLATELET 637 527 - 667 K/uL    MPV 10.6 8.9 - 12.9 FL    NRBC 0.0 0  WBC    ABSOLUTE NRBC 0.00 0.00 - 0.01 K/uL    NEUTROPHILS 42 32 - 75 %    LYMPHOCYTES 40 12 - 49 %    MONOCYTES 15 (H) 5 - 13 %    EOSINOPHILS 1 0 - 7 %    BASOPHILS 1 0 - 1 %    IMMATURE GRANULOCYTES 0 0.0 - 0.5 %    ABS. NEUTROPHILS 3.1 1.8 - 8.0 K/UL    ABS. LYMPHOCYTES 3.0 0.8 - 3.5 K/UL    ABS. MONOCYTES 1.1 (H) 0.0 - 1.0 K/UL    ABS. EOSINOPHILS 0.1 0.0 - 0.4 K/UL    ABS. BASOPHILS 0.1 0.0 - 0.1 K/UL    ABS. IMM.  GRANS. 0.0 0.00 - 0.04 K/UL    DF AUTOMATED     METABOLIC PANEL, COMPREHENSIVE   Result Value Ref Range    Sodium 139 136 - 145 mmol/L    Potassium 3.6 3.5 - 5.1 mmol/L    Chloride 101 97 - 108 mmol/L    CO2 24 21 - 32 mmol/L    Anion gap 14 5 - 15 mmol/L    Glucose 141 (H) 65 - 100 mg/dL    BUN 25 (H) 6 - 20 MG/DL    Creatinine 1.28 0.70 - 1.30 MG/DL    BUN/Creatinine ratio 20 12 - 20      GFR est AA >60 >60 ml/min/1.73m2    GFR est non-AA >60 >60 ml/min/1.73m2    Calcium 8.4 (L) 8.5 - 10.1 MG/DL    Bilirubin, total 0.7 0.2 - 1.0 MG/DL    ALT (SGPT) 123 (H) 12 - 78 U/L    AST (SGOT) 69 (H) 15 - 37 U/L    Alk.  phosphatase 68 45 - 117 U/L    Protein, total 9.9 (H) 6.4 - 8.2 g/dL    Albumin 4.2 3.5 - 5.0 g/dL    Globulin 5.7 (H) 2.0 - 4.0 g/dL    A-G Ratio 0.7 (L) 1.1 - 2.2     ETHYL ALCOHOL   Result Value Ref Range    ALCOHOL(ETHYL),SERUM 321 (H) <10 MG/DL   DRUG SCREEN, URINE   Result Value Ref Range    AMPHETAMINES NEGATIVE  NEG      BARBITURATES NEGATIVE  NEG      BENZODIAZEPINES NEGATIVE  NEG      COCAINE NEGATIVE  NEG      METHADONE NEGATIVE  NEG      OPIATES NEGATIVE  NEG      PCP(PHENCYCLIDINE) NEGATIVE  NEG      THC (TH-CANNABINOL) NEGATIVE  NEG      Drug screen comment (NOTE)    ACETAMINOPHEN   Result Value Ref Range    Acetaminophen level <2 (L) 10 - 30 ug/mL   SALICYLATE   Result Value Ref Range    Salicylate level <1.8 (L) 2.8 - 20.0 MG/DL   URINALYSIS W/MICROSCOPIC   Result Value Ref Range    Color YELLOW/STRAW      Appearance HAZY      Specific gravity 1.026      pH (UA) 6.0      Protein 30 mg/dL    Glucose NEGATIVE  mg/dL    Ketone NEGATIVE  mg/dL    Blood NEGATIVE       Urobilinogen 0.2 EU/dL    Nitrites NEGATIVE       Leukocyte Esterase NEGATIVE       WBC 0-4 0 - 4 /hpf    RBC 0-5 0 - 5 /hpf    Epithelial cells FEW FEW /lpf    Bacteria NEGATIVE  NEG /hpf    Mucus 3+ (A) NEG /lpf    Hyaline cast >20 (H) 0 - 5 /lpf   BILIRUBIN, CONFIRM   Result Value Ref Range    Bilirubin UA, confirm NEGATIVE  NEG     TSH 3RD GENERATION   Result Value Ref Range    TSH 2.87 0.36 - 3.74 uIU/mL   HEMOGLOBIN A1C WITH EAG   Result Value Ref Range    Hemoglobin A1c 5.3 4.2 - 6.3 %    Est. average glucose 105 mg/dL       Immunizations administered during this encounter:   Immunization History   Administered Date(s) Administered    Influenza Vaccine 10/31/2013, 2017    Td 2010    Tdap 2017       Screening for Metabolic Disorders for Patients on Antipsychotic Medications  (Data obtained from the EMR)    Estimated Body Mass Index  Estimated body mass index is 32.22 kg/(m^2) as calculated from the following:    Height as of this encounter: 6' (1.829 m). Weight as of this encounter: 107.8 kg (237 lb 9.6 oz). Vital Signs/Blood Pressure  Visit Vitals    BP (!) 127/95    Pulse (!) 111    Temp 98 °F (36.7 °C)    Resp 14    Ht 6' (1.829 m)    Wt 107.8 kg (237 lb 9.6 oz)    SpO2 96%    BMI 32.22 kg/m2       Blood Glucose/Hemoglobin A1c  Lab Results   Component Value Date/Time    Glucose 141 (H) 2018 06:36 PM       Lab Results   Component Value Date/Time    Hemoglobin A1c 5.3 2018 06:36 PM        Lipid Panel  Lab Results   Component Value Date/Time    Cholesterol, total 266 (H) 01/15/2018 08:18 AM    HDL Cholesterol 41 01/15/2018 08:18 AM    LDL, calculated 168 (H) 01/15/2018 08:18 AM    Triglyceride 286 (H) 01/15/2018 08:18 AM        Discharge Diagnosis: Alcohol abuse (ICD-10-CM: F10.10)    Discharge Plan: Patient discharged home into the care of his wife. DISCHARGE SUMMARY    NAME:Stone Martin  : 1985  MRN: [id number]    The patient Kyle Abdi exhibits the ability to control behavior in a less restrictive environment. Patient's level of functioning is improving. No assaultive/destructive behavior has been observed for the past 24 hours. No suicidal/homicidal threat or behavior has been observed for the past 24 hours. There is no evidence of serious medication side effects. Patient has not been in physical or protective restraints for at least the past 24 hours. If weapons involved, how are they secured? No weapons involved. Is patient aware of and in agreement with discharge plan?  Yes    Arrangements for medication:  No prescriptions written. Referral for substance abuse treatment? Dr. Ilya Pulliam    Referral for smoking cessation needed? Patient is not a smoker/Not applicable. Copy of discharge instructions to provider?:  Matthew Verdin    Arrangements for transportation home:  Wife to . Keep all follow up appointments as scheduled, continue to take prescribed medications per physician instructions. Mental health crisis number:  103 or your local mental health crisis line number at [phone]. Discharge Medication List and Instructions:   Discharge Medication List as of 4/19/2018  1:40 PM      CONTINUE these medications which have NOT CHANGED    Details   ondansetron (ZOFRAN ODT) 8 mg disintegrating tablet Take 8 mg by mouth every eight (8) hours as needed for Nausea., Historical Med      escitalopram oxalate (LEXAPRO) 10 mg tablet Take 1 Tab by mouth daily. , Normal, Disp-30 Tab, R-1      levothyroxine (SYNTHROID) 75 mcg tablet Take 1 Tab by mouth Daily (before breakfast). Indications: Hashimoto Thyroiditis, Normal, Disp-90 Tab, R-3      losartan-hydroCHLOROthiazide (HYZAAR) 50-12.5 mg per tablet TAKE 1 TABLET BY MOUTH EVERY DAY, Normal, Disp-90 Tab, R-3             Unresulted Labs (24h ago through future)    Start     Ordered    27/32/64 1242  METABOLIC PANEL, COMPREHENSIVE  ONE TIME,   TIME      04/19/18 0747    04/19/18 0800  LIPID PANEL  ONE TIME,   TIME      04/19/18 0747    04/18/18 1815  Hold Sample  ONE TIME,   S      04/18/18 1806        To obtain results of studies pending at discharge, please contact [phone]    Follow-up Information     Follow up With Details Comments [facility] Bryan Oviedo MD On 4/25/2018 You have a 4:20pm hospital discharge follow-up appointment with your primary care provider. [address] [place] [address]      Hilaria [place] On 4/26/2018 You have a 12:00pm appointment for individual therapy.  [facility] [place] [id number]  [place]  [phone]          Advanced Directive:   Does the patient have an appointed surrogate decision maker? No  Does the patient have a Medical Advance Directive? No  Does the patient have a Psychiatric Advance Directive? No  If the patient does not have a surrogate or Medical Advance Directive AND Psychiatric Advance Directive, the patient was offered information on these advance directives Yes and Patient declined to complete    Patient Instructions: Please continue all medications until otherwise directed by physician. Tobacco Cessation Discharge Plan:   Is the patient a smoker and needs referral for smoking cessation? No  Patient referred to the following for smoking cessation with an appointment? Refused     Patient was offered medication to assist with smoking cessation at discharge? Refused  Was education for smoking cessation added to the discharge instructions? Yes    Alcohol/Substance Abuse Discharge Plan:   Does the patient have a history of substance/alcohol abuse and requires a referral for treatment? Yes  Patient referred to the following for substance/alcohol abuse treatment with an appointment? Yes, Patient has an appointment with Dr. Sierra Martinez on 4/25/18 at 4:20pm.  Patient was offered medication to assist with alcohol cessation at discharge? Refused  Was education for substance/alcohol abuse added to discharge instructions? Yes    Patient discharged to Home; discussed with patient/caregiver and provided to the patient/caregiver either in hard copy or electronically.

## 2018-04-19 NOTE — DISCHARGE SUMMARY
PSYCHIATRIC DISCHARGE SUMMARY         IDENTIFICATION:    Patient Name  Tha Love   Date of Birth 1985   [facility] [id number]   Medical Record Number  [id number]      Age  27 y.o. 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.3  ICD-9-CM: 244.8         Mixed hyperlipidemia ICD-10-CM: E78.2  ICD-9-CM: 272.2         Essential hypertension, benign ICD-10-CM: I10  ICD-9-CM: 401.1         Dysphagia ICD-10-CM: R13.10  ICD-9-CM: 787.20         Osteoarthrosis, unspecified whether generalized or localized, lower leg ICD-10-CM: M17.10  ICD-9-CM: 715.96               Active Hospital Problems    Alcohol abuse        DISCHARGE DIAGNOSIS:   Axis I:  SEE ABOVE  Axis II: SEE ABOVE  Axis III: SEE ABOVE  Axis IV:  lack of structure  Axis V:  70 on admission, 70 on discharge 70(baseline)       CC & HISTORY OF PRESENT ILLNESS:  27year old  male admitted for depression and siucidal statements he retracted. Pt relapsed into alcohol use after his brother  from an od. Patient DENIES SI/HI INTENT AND PLAN, AND DID NOT MEET TDO CRITERIA. SOCIAL HISTORY:    Social History     Social History    Marital status:      Spouse name: N/A    Number of children: 0    Years of education: N/A     Occupational History    Not on file.      Social History Main Topics    Smoking status: Never Smoker    Smokeless tobacco: Former User     Quit date: 2010    Alcohol use Yes    Drug use: No    Sexual activity: Yes     Partners: Female     Other Topics Concern    Not on file     Social History Narrative    27year old male admitted voluntarily for SI. Pt has Sleep apnea. Pt has been abusing alcohol BAL was 321. He v is followed for behavior health at [facility]. FAMILY HISTORY:   Family History   Problem Relation Age of Onset    Diabetes Father     Hypertension Father     Thyroid Disease Father     Anemia Father      multiple myeloma    Rashes/Skin Problems Mother     Eczema Mother     Hypertension Maternal Grandmother     Arthritis-osteo Paternal Grandmother     Alcohol abuse Brother      heroin overdose             HOSPITALIZATION COURSE:    Marie Garvey was admitted to the inpatient psychiatric unit [facility] for acute psychiatric stabilization in regards to symptomatology as described in the HPI above. The differential diagnosis at time of admission included: ADJUSTMENT D/O . While on the unit Marie Garvey was involved in individual, group, occupational and milieu therapy. Much of patient's depression appeared to be related to situational stressors and psychological factors. Please see individual progress notes for more specific details regarding patient's hospitalization course. At time of dc, Marie Garvey was without significant problems with depression psychosis  scarlet. Overall presentation at time of discharge is most consistent with the diagnosis of adjustment disorder with depressed mood. Patient with request for discharge today. There are no grounds to seek a TDO. Patient has maximized benefit to be derived from acute inpatient psychiatric treatment. All members of the treatment team concur with each other in regards to plans for discharge today per patient's request.          LABS AND IMAGAING:    Labs Reviewed   CBC WITH AUTOMATED DIFF - Abnormal; Notable for the following:        Result Value    HGB 17.3 (*)     MONOCYTES 15 (*)     ABS.  MONOCYTES 1.1 (*)     All other components within normal limits   METABOLIC PANEL, COMPREHENSIVE - Abnormal; Notable for the following:     Glucose 141 (*)     BUN 25 (*)     Calcium 8.4 (*)     ALT (SGPT) 123 (*)     AST (SGOT) 69 (*)     Protein, total 9.9 (*)     Globulin 5.7 (*)     A-G Ratio 0.7 (*)     All other components within normal limits   ETHYL ALCOHOL - Abnormal; Notable for the following:     ALCOHOL(ETHYL),SERUM 321 (*)     All other components within normal limits   ACETAMINOPHEN - Abnormal; Notable for the following:     Acetaminophen level <2 (*)     All other components within normal limits   SALICYLATE - Abnormal; Notable for the following:     Salicylate level <4.3 (*)     All other components within normal limits   URINALYSIS W/MICROSCOPIC - Abnormal; Notable for the following:     Mucus 3+ (*)     Hyaline cast >20 (*)     All other components within normal limits   DRUG SCREEN, URINE   BILIRUBIN, CONFIRM   TSH 3RD GENERATION   HEMOGLOBIN A1C WITH EAG   SAMPLES BEING HELD   METABOLIC PANEL, COMPREHENSIVE   LIPID PANEL     Admission on 04/18/2018   Component Date Value Ref Range Status    WBC 04/18/2018 7.4  4.1 - 11.1 K/uL Final    RBC 04/18/2018 5.22  4.10 - 5.70 M/uL Final    HGB 04/18/2018 17.3* 12.1 - 17.0 g/dL Final    HCT 04/18/2018 48.8  36.6 - 50.3 % Final    MCV 04/18/2018 93.5  80.0 - 99.0 FL Final    MCH 04/18/2018 33.1  26.0 - 34.0 PG Final    MCHC 04/18/2018 35.5  30.0 - 36.5 g/dL Final    RDW 04/18/2018 13.6  11.5 - 14.5 % Final    PLATELET 56/42/1001 217  150 - 400 K/uL Final    MPV 04/18/2018 10.6  8.9 - 12.9 FL Final    NRBC 04/18/2018 0.0  0  WBC Final    ABSOLUTE NRBC 04/18/2018 0.00  0.00 - 0.01 K/uL Final    NEUTROPHILS 04/18/2018 42  32 - 75 % Final    LYMPHOCYTES 04/18/2018 40  12 - 49 % Final    MONOCYTES 04/18/2018 15* 5 - 13 % Final    EOSINOPHILS 04/18/2018 1  0 - 7 % Final    BASOPHILS 04/18/2018 1  0 - 1 % Final    IMMATURE GRANULOCYTES 04/18/2018 0  0.0 - 0.5 % Final    ABS.  NEUTROPHILS 04/18/2018 3.1  1.8 - 8.0 K/UL Final    ABS. LYMPHOCYTES 04/18/2018 3.0  0.8 - 3.5 K/UL Final    ABS. MONOCYTES 04/18/2018 1.1* 0.0 - 1.0 K/UL Final    ABS. EOSINOPHILS 04/18/2018 0.1  0.0 - 0.4 K/UL Final    ABS. BASOPHILS 04/18/2018 0.1  0.0 - 0.1 K/UL Final    ABS. IMM. GRANS. 04/18/2018 0.0  0.00 - 0.04 K/UL Final    DF 04/18/2018 AUTOMATED    Final    Sodium 04/18/2018 139  136 - 145 mmol/L Final    Potassium 04/18/2018 3.6  3.5 - 5.1 mmol/L Final    Chloride 04/18/2018 101  97 - 108 mmol/L Final    CO2 04/18/2018 24  21 - 32 mmol/L Final    Anion gap 04/18/2018 14  5 - 15 mmol/L Final    Glucose 04/18/2018 141* 65 - 100 mg/dL Final    BUN 04/18/2018 25* 6 - 20 MG/DL Final    Creatinine 04/18/2018 1.28  0.70 - 1.30 MG/DL Final    BUN/Creatinine ratio 04/18/2018 20  12 - 20   Final    GFR est AA 04/18/2018 >60  >60 ml/min/1.73m2 Final    GFR est non-AA 04/18/2018 >60  >60 ml/min/1.73m2 Final    Calcium 04/18/2018 8.4* 8.5 - 10.1 MG/DL Final    Bilirubin, total 04/18/2018 0.7  0.2 - 1.0 MG/DL Final    ALT (SGPT) 04/18/2018 123* 12 - 78 U/L Final    AST (SGOT) 04/18/2018 69* 15 - 37 U/L Final    Alk.  phosphatase 04/18/2018 68  45 - 117 U/L Final    Protein, total 04/18/2018 9.9* 6.4 - 8.2 g/dL Final    Albumin 04/18/2018 4.2  3.5 - 5.0 g/dL Final    Globulin 04/18/2018 5.7* 2.0 - 4.0 g/dL Final    A-G Ratio 04/18/2018 0.7* 1.1 - 2.2   Final    ALCOHOL(ETHYL),SERUM 04/18/2018 321* <10 MG/DL Final    AMPHETAMINES 04/18/2018 NEGATIVE   NEG   Final    BARBITURATES 04/18/2018 NEGATIVE   NEG   Final    BENZODIAZEPINES 04/18/2018 NEGATIVE   NEG   Final    COCAINE 04/18/2018 NEGATIVE   NEG   Final    METHADONE 04/18/2018 NEGATIVE   NEG   Final    OPIATES 04/18/2018 NEGATIVE   NEG   Final    PCP(PHENCYCLIDINE) 04/18/2018 NEGATIVE   NEG   Final    THC (TH-CANNABINOL) 04/18/2018 NEGATIVE   NEG   Final    Drug screen comment 04/18/2018 (NOTE)   Final    Acetaminophen level 04/18/2018 <2* 10 - 30 ug/mL Final    Salicylate level 82/64/8941 <1.7* 2.8 - 20.0 MG/DL Final    Color 04/18/2018 YELLOW/STRAW    Final    Appearance 04/18/2018 HAZY    Final    Specific gravity 04/18/2018 1.026    Final    pH (UA) 04/18/2018 6.0    Final    Protein 04/18/2018 30  mg/dL Final    Glucose 04/18/2018 NEGATIVE   mg/dL Final    Ketone 04/18/2018 NEGATIVE   mg/dL Final    Blood 04/18/2018 NEGATIVE     Final    Urobilinogen 04/18/2018 0.2  EU/dL Final    Nitrites 04/18/2018 NEGATIVE     Final    Leukocyte Esterase 04/18/2018 NEGATIVE     Final    WBC 04/18/2018 0-4  0 - 4 /hpf Final    RBC 04/18/2018 0-5  0 - 5 /hpf Final    Epithelial cells 04/18/2018 FEW  FEW /lpf Final    Bacteria 04/18/2018 NEGATIVE   NEG /hpf Final    Mucus 04/18/2018 3+* NEG /lpf Final    Hyaline cast 04/18/2018 >20* 0 - 5 /lpf Final    Bilirubin UA, confirm 04/18/2018 NEGATIVE   NEG   Final    TSH 04/18/2018 2.87  0.36 - 3.74 uIU/mL Final    Hemoglobin A1c 04/18/2018 5.3  4.2 - 6.3 % Final    Est. average glucose 04/18/2018 105  mg/dL Final     No results found. DISPOSITION:    Home. Patient to f/u with o/p drug/etoh rehabilitation, psychiatric, and psychotherapy appointments. Patient is to f/u with internist as directed. .               FOLLOW-UP CARE:    Activity as tolerated  Regular Diet  Wound Care: none needed. Follow-up Information     Follow up With Details [address], [address] [facility] [address] [facility]  [phone]                   PROGNOSIS:  Greatly dependent upon patient's ability to remain sober and to f/u with o/p drug/etoh rehabilitation and psychiatric/psychotherapy appointments as well as to comply with psychiatric medications as prescribed. Patient denies suicidal or homicidal ideations. Evangelina Carreonjuan fully contracts for safety. Patient reports many positive predictive factors in terms of not attempting suicide or homicide.  Patient appears to be at low risk of suicide or homicide. Patient and family are aware and in agreement with discharge and discharge plan. DISCHARGE MEDICATIONS: (no changes made). Informed consent given for the use of following psychotropic medications:  Current Discharge Medication List      CONTINUE these medications which have NOT CHANGED    Details   ondansetron (ZOFRAN ODT) 8 mg disintegrating tablet Take 8 mg by mouth every eight (8) hours as needed for Nausea. escitalopram oxalate (LEXAPRO) 10 mg tablet Take 1 Tab by mouth daily. Qty: 30 Tab, Refills: 1    Associated Diagnoses: Dysthymia      levothyroxine (SYNTHROID) 75 mcg tablet Take 1 Tab by mouth Daily (before breakfast). Indications: Hashimoto Thyroiditis  Qty: 90 Tab, Refills: 3    Associated Diagnoses: Autoimmune hypothyroidism; Essential hypertension, benign; Mixed hyperlipidemia      losartan-hydroCHLOROthiazide (HYZAAR) 50-12.5 mg per tablet TAKE 1 TABLET BY MOUTH EVERY DAY  Qty: 90 Tab, Refills: 3    Associated Diagnoses: Essential hypertension, benign                    A coordinated, multidisplinary treatment team round was conducted with Mni Villatoro is done daily here at [facility] . This team consists of the nurse, psychiatric unit pharmcist,  and writer. I have spent greater than 35 minutes on discharge work.     Signed:  Hugo Dai MD  4/19/2018

## 2018-04-19 NOTE — PROGRESS NOTES
[address] [facility] [address]  Master Treatment Plan for Stephie Sol    Date Treatment Plan Initiated: 4/19/18    Treatment Plan Modalities:  Type of Modality Amount  (x minutes) Frequency (x/week) Duration (x days) Name of Responsible Staff   [address] [facility] meetings to encourage peer interactions 15 7 32 [facility] psychotherapy to assist in building coping skills and internal controls 60 7 1 Allen Zapata   Therapeutic activity groups to build coping skills 60 7 1 Allen Zapata   Psychoeducation in group setting to address:   Medication education   [address] skills         Relaxation techniques         Symptom management         Discharge planning   60 2 255 [facility]    60 2 [facility]   60 1 1 volunteer   Recovery/AA/NA      volunteer   Physician medication management   13 7 1 Dr. Kian Gould meeting/discharge planning   15 2 [address] [facility]                                        Problem: Alcohol Withdrawal these goals will be met by 4/22/18  Goal: *STG: Participates in treatment plan  Outcome: Progressing Towards Goal  Out on unit social w peers and staff. Mood and affect brighter, full range and describes guilt and disappointment in self. Denies SI. States he has too much to live for such as family and Taoist. States he is realizing his sins and mistakes are not as bad as originally he thought. States does not want to miss out on his sons life. Daily goal is to discuss d/c plans  Goal: *STG: Seeks staff when symptoms of withdrawal increase  Outcome: Progressing Towards Goal  CIWA does not indicate mediation intervention  Goal: *STG: Will identify negative impact of chemical dependency including the use of tobacco, alcohol, and other substances  Outcome: Progressing Towards Goal  Able to in length describe negative impact etoh use has on his life. Describes guilt and disappointment that he would use etoh again. States he fears what could of happened if he was intoxicated and needed to be sober for his child, wife and Mandaen family.    Goal: *STG: Agrees to participate in outpatient after care program to support ongoing mental health  Outcome: Progressing Towards Goal  States he does not think program is necessary however willing to follow any recommendations  Goal: Interventions  Outcome: Progressing Towards Goal  Staff focus is on coping skills educaiton

## 2018-04-19 NOTE — PROGRESS NOTES
TRANSFER - IN REPORT:    Verbal report received from Fernando Porter RN on Pennie Woodson  being received from ED for routine progression of care      Report consisted of patients Situation, Background, Assessment and   Recommendations(SBAR). Information from the following report(s) SBAR, Kardex and ED Summary was reviewed with the receiving Jose Orellana RN who received report before this shift. Opportunity for questions and clarification was provided. Assessment completed upon patients arrival to unit at 2327 and care assumed.

## 2018-04-19 NOTE — ROUTINE PROCESS
TRANSFER - OUT REPORT:    Verbal report given to Lacy Alvarenga RN(name) on Olga Thrasher  being transferred to (unit) for routine progression of care       Report consisted of patients Situation, Background, Assessment and   Recommendations(SBAR). Information from the following report(s) SBAR, ED Summary, [facility] and Recent Results was reviewed with the receiving nurse. Lines:       Opportunity for questions and clarification was provided.       Patient transported with:   Registered Nurse and HPD/security

## 2018-04-19 NOTE — BH NOTES
200  Pt arrived ambulatory accompanied by RN from ED+Security & 2 family members. Pt was steady on his feet, but restless & anxious to know when he can go home. Pt gave permission to share his info with his family who will call him; to give Security Code # when they call back later after pt admitted. Pt is a Voluntary admission for Dr. Lester Love by  for SI; Pt has contracted for Safety while here. Tour of Unit initiated. Monitoring initiated.

## 2018-04-19 NOTE — INTERDISCIPLINARY ROUNDS
Behavioral Health Interdisciplinary Rounds     Patient Name: Pam Lopes  Age: 27 y.o. Room/Bed:  725/  Primary Diagnosis: <principal problem not specified>   Admission Status: Voluntary     Readmission within 30 days: no  Power of  in place:   Patient requires a blocked bed: no          Reason for blocked bed: na    VTE Prophylaxis: No    Mobility needs/Fall risk: no    Nutritional Plan: no  Consults: Hospitalist to address Medicines for Thyroid         Labs/Testing due today?: no    Sleep hours:  6+      Participation in Care/Groups:  No--new admit  Medication Compliant?: Yes  PRNS (last 24 hours): Sleep Aid    Restraints (last 24 hours):  no     CIWA (range last 24 hours): CIWA-Ar Total: 1   COWS (range last 24 hours):      Alcohol screening (AUDIT) completed -   AUDIT Score: 13     If applicable, date SBIRT discussed in treatment team AND documented: 4/19/18  AUDIT Screen Score: AUDIT Score: 13      Document Brief Intervention (corresponds directly with the 5 A's, Ask, Advise, Assess, Assist, and Arrange):  Patient reported he relapsed ETOH. Wants to stop drinking and states he can do that on his own. At- Risk Patients (Score 7-15 for women; 8-15 for men)  Discuss concern patient is drinking at unhealthy levels known to increase risk of alcohol-related health problems. Is Patient ready to commit to change? Yes    If Yes:   Set goal: Abstain from drinking   Plan: Attend AA/Al-leonardan   Educational materials provided: Attached to discharge paperwork    Tobacco - patient is a smoker: Have You Used Tobacco in the Past 30 Days: No  Illegal Drugs use: Have You Used Any Illegal Substances Over the Past 12 Months: No    24 hour chart check complete: yes     Patient goal(s) for today: Discharge  Treatment team focus/goals: Discharge  Progress note: Patient requesting to leave.     LOS:  1  Expected LOS: 1    Financial concerns/prescription coverage: [employer]  Date of last family contact: None Family requesting physician contact today: No  Discharge plan: Return home  Guns in the home: No       Outpatient provider(s): Dr. Gisell Samuels (PCP)    Participating treatment team members: ANTOINE Brown; Dr. Regina Cornelius MD; Afia Sánchez RN

## 2018-04-19 NOTE — BH NOTES
TRANSFER - IN REPORT:    Verbal report received from  [address]  on Evangelina Beauchamp  being received from  [facility]/ED for routine progression of care      Report consisted of patients Situation, Background, Assessment and   Recommendations(SBAR). Information from the following report(s) SBAR, Kardex, ED Summary and Recent Results was reviewed with the receiving nurse. Opportunity for questions and clarification was provided. Assessment completed upon patients arrival to unit and care assumed.

## 2018-04-19 NOTE — BH NOTES
PRN Medication Documentation    Specific patient behavior that led to need for PRN medication: sleeplessness  Staff interventions attempted prior to PRN being given: relaxation/education  PRN medication given: ambien  Patient response/effectiveness of PRN medication: will continue to monitor

## 2018-04-19 NOTE — BH NOTES
Primary Nurse Bisi García and Preethi Rick., RN performed a dual skin assessment on this patient No Impairment Noted  Rosendo score is 23

## 2018-04-20 ENCOUNTER — TELEPHONE (OUTPATIENT)
Dept: INTERNAL MEDICINE CLINIC | Age: 33
End: 2018-04-20

## 2018-04-20 DIAGNOSIS — F34.1 DYSTHYMIA: ICD-10-CM

## 2018-04-20 RX ORDER — ESCITALOPRAM OXALATE 20 MG/1
20 TABLET ORAL DAILY
Qty: 30 TAB | Refills: 1 | Status: SHIPPED | OUTPATIENT
Start: 2018-04-20 | End: 2018-08-03 | Stop reason: SDUPTHER

## 2018-04-20 NOTE — TELEPHONE ENCOUNTER
----- Message from Hai Velásquez MD sent at 4/20/2018 12:04 PM EDT -----  Regarding: FW: Visit Follow-Up Question  Contact: [phone]  Call-  - increase lexapro to 20 mg every day , send in new rx, See psychologist as discussed/directed   - have her start calling psychiatrists that participate with his insurance, get an appt, send me the doctor's  name and I'll call them to indicate it needs to be an urgent appointment  - talk to Fili Villatoro to see if there is a patient advocate she can talk to because I think this is absolutely terrible and I am embarrassed for our medical group and hospital  ----- Message -----     From: Danny Baltazar LPN     Sent: 2/53/2750  11:00 AM       To: Hai Velásquez MD  Subject: FW: Visit Follow-Up Question                         ----- Message -----     From: Marcella Soler     Sent: 4/20/2018  10:36 AM       To: [facility] Nurses Pool  Subject: Visit Follow-Up Question                         Good Morning Dr. Adam Horne,  This is Rajendra Simple. I called yesterday because I wanted to give you some info on Stone's stay at [address] [place] [address]. It wasn't a good experience. We waited on a gurney from 6-11:45pm. We were told he'd speak with a psychiatrist and they would adjust meds. They said we'd leave [facility] with a psychiatrist appt but didn't. He can't get in to see Richfield Zeke until Thursday the 26th. All medications remain the same so his stay seems pointless. All we left with was an ER bill. I spoke with a nurse from your office and explained everything to her a few minutes ago. I just feel like it's too long of a time to go without a change in his care plan. thanks!

## 2018-04-20 NOTE — TELEPHONE ENCOUNTER
----- Message from Paulie Moreno MD sent at 4/20/2018 12:04 PM EDT -----  Regarding: FW: Visit Follow-Up Question  Contact: [phone]  Call-  - increase lexapro to 20 mg every day , send in new rx, See psychologist as discussed/directed   - have her start calling psychiatrists that participate with his insurance, get an appt, send me the doctor's  name and I'll call them to indicate it needs to be an urgent appointment  - talk to Maria Del Carmen Yoana to see if there is a patient advocate she can talk to because I think this is absolutely terrible and I am embarrassed for our medical group and hospital  ----- Message -----     From: Mariana Monterroso LPN     Sent: 4/35/1795  11:00 AM       To: Paulie Moreno MD  Subject: FW: Visit Follow-Up Question                         ----- Message -----     From: Brittany Padilla     Sent: 4/20/2018  10:36 AM       To: [facility]  Subject: Visit Follow-Up Question                         Good Morning Dr. Tino Barrios,  This is Guillermina Slaughter. I called yesterday because I wanted to give you some info on Stone's stay at [facility]. It wasn't a good experience. We waited on a gurney from 6-11:45pm. We were told he'd speak with a psychiatrist and they would adjust meds. They said we'd leave [facility] with a psychiatrist appt but didn't. He can't get in to see Katie Barajas until Thursday the 26th. All medications remain the same so his stay seems pointless. All we left with was an ER bill. I spoke with a nurse from your office and explained everything to her a few minutes ago. I just feel like it's too long of a time to go without a change in his care plan. thanks!

## 2018-04-20 NOTE — TELEPHONE ENCOUNTER
Spoke with pt's wife Liz Smith (on [facility]) - states pt was sent to [facility] ER on 4/18/18 for his severe depression. Was admitted over night on psychiatric floor. Discharged yesterday. She states it was a bad experience - not helpful. She came to  pt - asked nurse what was the plan for pt at discharge - any medication to start or referral to psychiatrist? She was told no plan. I did not check visit in chart due to warning -this information is protected by break the glass. She is concerned with long wait until he is back to see Dr Rhoades Child - appt 4/25/18. He is not able to see his therapist - not there on [address]. She wants to know what MD recommends. Advised he is not in office today. I will forward message to him.

## 2018-04-20 NOTE — TELEPHONE ENCOUNTER
Spoke with pt's wife Abner Lennox (on [employer]) - advised her MD recommends   1- increase lexapro to 20 mg every day - sent in new rx. 2- see psychologist as discussed/directed - she needs to start calling psychiatrists that participate with his insurance - get an appt then send Dr Shoemaker Sin the doctor's name and he will call them to indicate it needs to be an urgent appointment. 3- advised her MD was sorry for her experience - will let our  Fortino Woody know and see if there is a patient advocate she can talk to because he thinks this is absolutely terrible and embarrassed for our medical group and hospital.    Will forward to Fortino Woody.

## 2018-04-20 NOTE — TELEPHONE ENCOUNTER
----- Message from Sidonie Cowden sent at 4/20/2018  8:00 AM EDT -----  Regarding: acs          er visit  [facility]  Male, 27 y.o., 1985  Weight:   237 lb 9.6 oz (107.8 kg)  Phone:   F:[phone]  PCP:   Deb Mcintyre MD  MRN:   [id number]  MyChart:    Active  Next Appt (With Me)  None  Next Appt (Any Provider)  04/25/2018    Dr Leandra Laurent: Chester Parson Pt's wife (p) [phone], calling to get f/up information on her   ER visit at [facility] last night , he was discharged this morning and wanted to understand what the plan of care will be until his appt for 4/25/18, she said after his visit last night and discharged from today, there were no new plans of care made.

## 2018-04-25 ENCOUNTER — OFFICE VISIT (OUTPATIENT)
Dept: INTERNAL MEDICINE CLINIC | Age: 33
End: 2018-04-25

## 2018-04-25 VITALS
SYSTOLIC BLOOD PRESSURE: 130 MMHG | OXYGEN SATURATION: 97 % | HEIGHT: 72 IN | RESPIRATION RATE: 16 BRPM | TEMPERATURE: 98.1 F | HEART RATE: 96 BPM | DIASTOLIC BLOOD PRESSURE: 82 MMHG | BODY MASS INDEX: 32.75 KG/M2 | WEIGHT: 241.8 LBS

## 2018-04-25 DIAGNOSIS — F32.5 MAJOR DEPRESSION IN REMISSION (HCC): ICD-10-CM

## 2018-04-25 DIAGNOSIS — E06.3 AUTOIMMUNE HYPOTHYROIDISM: Primary | ICD-10-CM

## 2018-04-25 DIAGNOSIS — G47.33 OSA (OBSTRUCTIVE SLEEP APNEA): ICD-10-CM

## 2018-04-25 DIAGNOSIS — I10 ESSENTIAL HYPERTENSION, BENIGN: ICD-10-CM

## 2018-04-25 PROBLEM — F10.10 ALCOHOL ABUSE: Status: RESOLVED | Noted: 2018-04-19 | Resolved: 2018-04-25

## 2018-04-25 PROBLEM — F32.A MILD DEPRESSION: Status: RESOLVED | Noted: 2018-03-24 | Resolved: 2018-04-25

## 2018-04-25 NOTE — PROGRESS NOTES
HISTORY OF PRESENT ILLNESS  Caleb Mcrae is a 27 y.o. male. HPI Mary Beth Ryan is seen today for follow up of severe depression, hypertension and other problems. 1.  Depression. He was seen in the office last week and required emergency transport to [facility] for psychiatric evaluation. He was severely depressed and had some suicidal ideations. As it turns out, he was also significantly intoxicated. He stayed overnight and discharged the next day having felt to have passed any risk of suicide. No arrangements were made for psychiatry consultation and his wife arranged for this. He sees a psychiatric nurse practitioner tomorrow. He saw his counselor today. I increased his Lexapro to 20 mg and he has felt significantly better and almost back to his normal self. He has returned to work and has participated in other activities. We talked at length about his alcohol intake. He had actually had no alcohol for about four months prior to the incident last week. He had a significant decompensation with depression and started drinking. Previous to that, he had less than 10 drinks per week on an average. He knows that he should not drink alcohol with his current medication regimen and his current problems. He will follow up with his mental health providers on a scheduled basis. 2.  Obstructive sleep apnea. He has now started on a CPAP apparatus and feels he is sleeping better. 3.  Hypothyroidism. TSH stable. We will recheck in 3 months. 4.  Hypertension. He has significant white coat syndrome. His outside readings are much better. Current Outpatient Prescriptions   Medication Sig    cpap machine kit Using nightly.  escitalopram oxalate (LEXAPRO) 20 mg tablet Take 1 Tab by mouth daily.  levothyroxine (SYNTHROID) 75 mcg tablet Take 1 Tab by mouth Daily (before breakfast).  Indications: Hashimoto Thyroiditis    losartan-hydroCHLOROthiazide (HYZAAR) 50-12.5 mg per tablet TAKE 1 TABLET BY MOUTH EVERY DAY     No current facility-administered medications for this visit. Review of Systems   Constitutional: Negative for diaphoresis. Neurological: Negative for weakness. Psychiatric/Behavioral: Positive for depression. The patient is nervous/anxious and has insomnia. Physical Exam   Constitutional: No distress. Cardiovascular: Normal rate and regular rhythm. Exam reveals no gallop and no friction rub. No murmur heard. Pulmonary/Chest: Effort normal and breath sounds normal.   Neurological: He is alert. Psychiatric: He has a normal mood and affect. His behavior is normal.   Nursing note and vitals reviewed. ASSESSMENT and PLAN  Diagnoses and all orders for this visit:    1. Autoimmune hypothyroidism- Continue current regimen of prescription and / or OTC medications     2. Major depression in remission [facility])- See specialists  as directed. , Continue current regimen of prescription and / or OTC medications     3. Essential hypertension, benign- Continue current regimen of prescription and / or OTC medications     4. FADI (obstructive sleep apnea)- See specialists  as directed.

## 2018-04-25 NOTE — MR AVS SNAPSHOT
[address] [facility] [address] [place] [address] 
[phone] Patient: Mini Montalvo MRN: [id number] QNN:70/2/0770 Visit Information Date & Time Provider Department Dept. Phone Encounter #  
 4/25/2018  4:20 PM Sam Rowland[address] [place] [address] Internal Medicine [phone] [id number] Follow-up Instructions Return in about 3 months (around 7/25/2018). Your Appointments 11/21/2018  3:50 PM  
Complete Physical with Kimberly Singh MD  
[facility] Internal Medicine [facility]) Appt Note: CPE (11/20/17)  
 [address] [facility] [address] [facility] [address]  
[id number] [address] Upcoming Health Maintenance Date Due Pneumococcal 19-64 Medium Risk (1 of 1 - PPSV23) 11/2/2002 DTaP/Tdap/Td series (2 - Td) 11/20/2027 Allergies as of 4/25/2018  Review Complete On: 4/25/2018 By: Kimberly Singh MD  
 No Known Allergies Current Immunizations  Reviewed on 11/20/2017 Name Date Influenza Vaccine 1/6/2017, 10/31/2013 Td 1/1/2010 Tdap 11/20/2017 Not reviewed this visit You Were Diagnosed With   
  
 Codes Comments Autoimmune hypothyroidism    -  Primary ICD-10-CM: E06.3 ICD-9-CM: 244.8 Major depression in remission [facility])     ICD-10-CM: F32.5 ICD-9-CM: 296.25 Essential hypertension, benign     ICD-10-CM: I10 
ICD-9-CM: 401.1 FADI (obstructive sleep apnea)     ICD-10-CM: G47.33 
ICD-9-CM: 327.23 Vitals BP Pulse Temp Resp Height(growth percentile) Weight(growth percentile) (!) 164/115 (BP 1 Location: Right arm, BP Patient Position: Sitting) 96 98.1 °F (36.7 °C) (Oral) 16 6' (1.829 m) 241 lb 12.8 oz (109.7 kg) SpO2 BMI Smoking Status 97% 32.79 kg/m2 Never Smoker BMI and BSA Data Body Mass Index Body Surface Area 32.79 kg/m 2 2.36 m 2 Preferred Pharmacy Pharmacy Name Phone [id number] [facility] [phone] Your Updated Medication List  
  
   
This list is accurate as of 4/25/18  5:13 PM.  Always use your most recent med list.  
  
  
  
  
 cpap machine kit Using nightly. escitalopram oxalate 20 mg tablet Commonly known as:  Levorn Allegra Take 1 Tab by mouth daily. levothyroxine 75 mcg tablet Commonly known as:  SYNTHROID Take 1 Tab by mouth Daily (before breakfast). Indications: Hashimoto Thyroiditis  
  
 losartan-hydroCHLOROthiazide 50-12.5 mg per tablet Commonly known as:  HYZAAR  
TAKE 1 TABLET BY MOUTH EVERY DAY Follow-up Instructions Return in about 3 months (around 7/25/2018). Introducing [facility] & HEALTH SERVICES! Dear Lynette Gupta: Thank you for requesting a Esanex account. Our records indicate that you already have an active Esanex account. You can access your account anytime at [email or web address] Did you know that you can access your hospital and ER discharge instructions at any time in Esanex? You can also review all of your test results from your hospital stay or ER visit. Additional Information If you have questions, please visit the Frequently Asked Questions section of the Esanex website at [email or web address] Remember, Esanex is NOT to be used for urgent needs. For medical emergencies, dial 911. Now available from your iPhone and Android! Please provide this summary of care documentation to your next provider. Your primary care clinician is listed as MIAH DENT. If you have any questions after today's visit, please call [phone].

## 2018-08-03 DIAGNOSIS — F34.1 DYSTHYMIA: ICD-10-CM

## 2018-08-03 RX ORDER — ESCITALOPRAM OXALATE 20 MG/1
20 TABLET ORAL DAILY
Qty: 30 TAB | Refills: 1 | Status: CANCELLED | OUTPATIENT
Start: 2018-08-03

## 2018-08-10 ENCOUNTER — OFFICE VISIT (OUTPATIENT)
Dept: INTERNAL MEDICINE CLINIC | Age: 33
End: 2018-08-10

## 2018-08-10 VITALS
BODY MASS INDEX: 31.41 KG/M2 | OXYGEN SATURATION: 98 % | TEMPERATURE: 98.3 F | WEIGHT: 237 LBS | HEIGHT: 73 IN | DIASTOLIC BLOOD PRESSURE: 100 MMHG | SYSTOLIC BLOOD PRESSURE: 158 MMHG | HEART RATE: 72 BPM | RESPIRATION RATE: 20 BRPM

## 2018-08-10 DIAGNOSIS — F10.21 ALCOHOLISM IN REMISSION (HCC): ICD-10-CM

## 2018-08-10 DIAGNOSIS — I10 ESSENTIAL HYPERTENSION, BENIGN: Primary | ICD-10-CM

## 2018-08-10 DIAGNOSIS — F34.1 DYSTHYMIA: ICD-10-CM

## 2018-08-10 DIAGNOSIS — E06.3 AUTOIMMUNE HYPOTHYROIDISM: ICD-10-CM

## 2018-08-10 DIAGNOSIS — F32.5 MAJOR DEPRESSION IN REMISSION (HCC): ICD-10-CM

## 2018-08-10 RX ORDER — ESCITALOPRAM OXALATE 20 MG/1
20 TABLET ORAL DAILY
Qty: 30 TAB | Refills: 5 | Status: SHIPPED | OUTPATIENT
Start: 2018-08-10 | End: 2019-03-08 | Stop reason: SDUPTHER

## 2018-08-10 NOTE — MR AVS SNAPSHOT
[address] [facility] [address] [facility] 
[phone] Patient: Alhaji Otero MRN: [id number] AZV:05/3/4509 Visit Information Date & Time Provider Department Dept. Phone Encounter #  
 8/10/2018  1:40 PM Sam Cordero[address] [place] [address] Internal Medicine [phone] [id number] Follow-up Instructions Return in about 6 months (around 2/10/2019). Your Appointments 11/21/2018  3:50 PM  
Complete Physical with Leyla Rees MD  
[facility] Appt Note: CPE (11/20/17)  
 [address] [facility] [address] [facility] [address]  
Sher[address] [address] [facility] [address] [facility] Upcoming Health Maintenance Date Due Influenza Age 5 to Adult 8/1/2018 DTaP/Tdap/Td series (2 - Td) 11/20/2027 Allergies as of 8/10/2018  Review Complete On: 8/10/2018 By: Leyla Rees MD  
 No Known Allergies Current Immunizations  Reviewed on 11/20/2017 Name Date Influenza Vaccine 1/6/2017, 10/31/2013 Td 1/1/2010 Tdap 11/20/2017 Not reviewed this visit You Were Diagnosed With   
  
 Codes Comments Essential hypertension, benign    -  Primary ICD-10-CM: I10 
ICD-9-CM: 401.1 Autoimmune hypothyroidism     ICD-10-CM: E06.3 ICD-9-CM: 244.8 Major depression in remission [facility])     ICD-10-CM: F32.5 ICD-9-CM: 296.25 Dysthymia     ICD-10-CM: F34.1 ICD-9-CM: 300.4 Vitals BP Pulse Temp Resp Height(growth percentile) Weight(growth percentile) (!) 158/100 72 98.3 °F (36.8 °C) 20 6' 1\" (1.854 m) 237 lb (107.5 kg) SpO2 BMI Smoking Status 98% 31.27 kg/m2 Never Smoker BMI and BSA Data Body Mass Index Body Surface Area  
 31.27 kg/m 2 2.35 m 2 Preferred Pharmacy Pharmacy Name Phone [id number] [facility] [phone] Your Updated Medication List  
  
   
This list is accurate as of 8/10/18  2:18 PM.  Always use your most recent med list.  
  
  
  
  
 cpap machine kit Using nightly. escitalopram oxalate 20 mg tablet Commonly known as:  Bryon Legacy Take 1 Tab by mouth daily. levothyroxine 75 mcg tablet Commonly known as:  SYNTHROID Take 1 Tab by mouth Daily (before breakfast). Indications: Hashimoto Thyroiditis  
  
 losartan-hydroCHLOROthiazide 50-12.5 mg per tablet Commonly known as:  HYZAAR  
TAKE 1 TABLET BY MOUTH EVERY DAY Prescriptions Sent to Pharmacy Refills  
 escitalopram oxalate (LEXAPRO) 20 mg tablet 5 Sig: Take 1 Tab by mouth daily. Class: Normal  
 Pharmacy: [address] #: [phone] Route: Oral  
  
We Performed the Following CBC WITH AUTOMATED DIFF [47386 CPT(R)] METABOLIC PANEL, COMPREHENSIVE [46909 CPT(R)] TSH 3RD GENERATION [00119 CPT(R)] Follow-up Instructions Return in about 6 months (around 2/10/2019). Introducing hospitals & HEALTH SERVICES! Dear Charbel Bob: Thank you for requesting a MyGoGames account. Our records indicate that you already have an active MyGoGames account. You can access your account anytime at [email or web address] Did you know that you can access your hospital and ER discharge instructions at any time in MyGoGames? You can also review all of your test results from your hospital stay or ER visit. Additional Information If you have questions, please visit the Frequently Asked Questions section of the MyGoGames website at [email or web address] Remember, MyGoGames is NOT to be used for urgent needs. For medical emergencies, dial 911. Now available from your iPhone and Android! Please provide this summary of care documentation to your next provider. Your primary care clinician is listed as MIAH DENT. If you have any questions after today's visit, please call [phone].

## 2018-08-10 NOTE — PROGRESS NOTES
HISTORY OF PRESENT ILLNESS  Erin Echeverria is a 27 y.o. male. HPI Subjective:  Dominik Griffin is seen today for follow up of hypertension and other problems:  1. Hypertension. Readings are increased, which is typical.  He's not checked any home readings. He has known white coat syndrome. He will check some home readings and report these to me within the next couple weeks. 2. Anxiety with depression, doing much better. He admits to significant alcoholism. He apparently was a \"closet drunk\" for several years. He is currently involved in counseling and AA. He feels better energy and generally feels so much better. Congratulations were offered. 3. Hypothyroidism. He is due for routine lab check. Current Outpatient Prescriptions   Medication Sig    escitalopram oxalate (LEXAPRO) 20 mg tablet Take 1 Tab by mouth daily.  cpap machine kit Using nightly.  levothyroxine (SYNTHROID) 75 mcg tablet Take 1 Tab by mouth Daily (before breakfast). Indications: Hashimoto Thyroiditis    losartan-hydroCHLOROthiazide (HYZAAR) 50-12.5 mg per tablet TAKE 1 TABLET BY MOUTH EVERY DAY     No current facility-administered medications for this visit. Review of Systems   Constitutional: Negative for weight loss. Respiratory: Negative. Cardiovascular: Negative for chest pain, palpitations, leg swelling and PND. Musculoskeletal: Negative for myalgias. Neurological: Negative for focal weakness. Psychiatric/Behavioral: Negative for depression. The patient is not nervous/anxious. Physical Exam   Constitutional: No distress. Cardiovascular: Normal rate and regular rhythm. Exam reveals no gallop and no friction rub. No murmur heard. Pulmonary/Chest: Effort normal and breath sounds normal.   Nursing note and vitals reviewed. ASSESSMENT and PLAN  Diagnoses and all orders for this visit:    1. Essential hypertension, benign  -     CBC WITH AUTOMATED DIFF  -     METABOLIC PANEL, COMPREHENSIVE    2. Autoimmune hypothyroidism  -     TSH 3RD GENERATION    3. Major depression in remission ([address]- Continue current regimen of prescription and / or OTC medications     4. Dysthymia  -     escitalopram oxalate (LEXAPRO) 20 mg tablet; Take 1 Tab by mouth daily.     5. Alcoholism in remission [facility])- See psychologist as discussed/directed

## 2018-08-12 PROBLEM — F10.21 ALCOHOLISM IN REMISSION (HCC): Status: ACTIVE | Noted: 2018-08-12

## 2019-01-07 DIAGNOSIS — I10 ESSENTIAL HYPERTENSION, BENIGN: ICD-10-CM

## 2019-01-07 RX ORDER — LOSARTAN POTASSIUM AND HYDROCHLOROTHIAZIDE 12.5; 5 MG/1; MG/1
TABLET ORAL
Qty: 90 TAB | Refills: 2 | Status: SHIPPED | OUTPATIENT
Start: 2019-01-07 | End: 2019-08-12 | Stop reason: SDUPTHER

## 2019-02-21 RX ORDER — OSELTAMIVIR PHOSPHATE 75 MG/1
75 CAPSULE ORAL DAILY
Qty: 10 CAP | Refills: 0 | Status: SHIPPED | OUTPATIENT
Start: 2019-02-21 | End: 2019-03-03

## 2019-02-26 ENCOUNTER — OFFICE VISIT (OUTPATIENT)
Dept: INTERNAL MEDICINE CLINIC | Age: 34
End: 2019-02-26

## 2019-02-26 VITALS
DIASTOLIC BLOOD PRESSURE: 90 MMHG | RESPIRATION RATE: 18 BRPM | HEART RATE: 73 BPM | OXYGEN SATURATION: 98 % | WEIGHT: 245.25 LBS | BODY MASS INDEX: 32.5 KG/M2 | TEMPERATURE: 97.9 F | SYSTOLIC BLOOD PRESSURE: 144 MMHG | HEIGHT: 73 IN

## 2019-02-26 DIAGNOSIS — F32.5 MAJOR DEPRESSION IN REMISSION (HCC): ICD-10-CM

## 2019-02-26 DIAGNOSIS — I10 ESSENTIAL HYPERTENSION, BENIGN: Primary | ICD-10-CM

## 2019-02-26 DIAGNOSIS — E06.3 AUTOIMMUNE HYPOTHYROIDISM: ICD-10-CM

## 2019-02-26 DIAGNOSIS — E78.2 MIXED HYPERLIPIDEMIA: ICD-10-CM

## 2019-02-26 DIAGNOSIS — G47.33 OSA (OBSTRUCTIVE SLEEP APNEA): ICD-10-CM

## 2019-02-26 DIAGNOSIS — F10.21 ALCOHOLISM IN REMISSION (HCC): ICD-10-CM

## 2019-02-26 NOTE — PROGRESS NOTES
HISTORY OF PRESENT ILLNESS  Olga Thrasher is a 26 y.o. male. HPI Subjective:  Mel Diana is seen today for follow up of hypertension and other problems. 1. Hypertension, fair readings. He has not checked any home readings. I asked him to do so and call me with readings in a couple weeks. He has longstanding white coat syndrome. 2. Anxiety. This is improved. We review current medication treatment. 3. Alcoholism. He is abstinent and has started working in support groups to help others. 4. Hypothyroidism. Due for routine labs. 5. FADI. He does not use CPAP as symptoms have resolved. 6. Influenza exposure. He was on Tamiflu. Other than some mild GI symptoms he has no influenza symptoms. Social History:  Notable for his second baby on the way. Congratulations are offered. Review of Systems   Constitutional: Negative for weight loss. Respiratory: Negative. Cardiovascular: Negative for chest pain, palpitations, leg swelling and PND. Gastrointestinal: Positive for diarrhea. Musculoskeletal: Negative for myalgias. Neurological: Negative for focal weakness. Psychiatric/Behavioral: The patient is nervous/anxious. Physical Exam   Constitutional: No distress. Neck: Carotid bruit is not present. Cardiovascular: Normal rate and regular rhythm. Exam reveals no gallop and no friction rub. No murmur heard. Pulmonary/Chest: Effort normal and breath sounds normal. No respiratory distress. Musculoskeletal: He exhibits no edema. Nursing note and vitals reviewed. ASSESSMENT and PLAN  Diagnoses and all orders for this visit:    1. Essential hypertension, benign  -     METABOLIC PANEL, COMPREHENSIVE  -     URINALYSIS W/ RFLX MICROSCOPIC    2. Autoimmune hypothyroidism  -     TSH 3RD GENERATION    3. Major depression in remission ([facility] [address] - Continue current regimen of prescription and / or OTC medications     4. Alcoholism in remission [facility])- Call with recurrence     5.  FADI (obstructive sleep apnea)- Follow off treatment     6.  Mixed hyperlipidemia  -     LIPID PANEL  -     CBC WITH AUTOMATED DIFF

## 2019-03-02 LAB
ALBUMIN SERPL-MCNC: 4.9 G/DL (ref 3.5–5.5)
ALBUMIN/GLOB SERPL: 1.6 {RATIO} (ref 1.2–2.2)
ALP SERPL-CCNC: 73 IU/L (ref 39–117)
ALT SERPL-CCNC: 27 IU/L (ref 0–44)
APPEARANCE UR: CLEAR
AST SERPL-CCNC: 18 IU/L (ref 0–40)
BASOPHILS # BLD AUTO: 0.1 X10E3/UL (ref 0–0.2)
BASOPHILS NFR BLD AUTO: 1 %
BILIRUB SERPL-MCNC: 0.3 MG/DL (ref 0–1.2)
BILIRUB UR QL STRIP: NEGATIVE
BUN SERPL-MCNC: 18 MG/DL (ref 6–20)
BUN/CREAT SERPL: 20 (ref 9–20)
CALCIUM SERPL-MCNC: 9.9 MG/DL (ref 8.7–10.2)
CHLORIDE SERPL-SCNC: 101 MMOL/L (ref 96–106)
CHOLEST SERPL-MCNC: 266 MG/DL (ref 100–199)
CO2 SERPL-SCNC: 26 MMOL/L (ref 20–29)
COLOR UR: YELLOW
CREAT SERPL-MCNC: 0.92 MG/DL (ref 0.76–1.27)
EOSINOPHIL # BLD AUTO: 0.3 X10E3/UL (ref 0–0.4)
EOSINOPHIL NFR BLD AUTO: 4 %
ERYTHROCYTE [DISTWIDTH] IN BLOOD BY AUTOMATED COUNT: 15.3 % (ref 12.3–15.4)
GLOBULIN SER CALC-MCNC: 3 G/DL (ref 1.5–4.5)
GLUCOSE SERPL-MCNC: 100 MG/DL (ref 65–99)
GLUCOSE UR QL: NEGATIVE
HCT VFR BLD AUTO: 42.3 % (ref 37.5–51)
HDLC SERPL-MCNC: 31 MG/DL
HGB BLD-MCNC: 14.5 G/DL (ref 13–17.7)
HGB UR QL STRIP: NEGATIVE
IMM GRANULOCYTES # BLD AUTO: 0 X10E3/UL (ref 0–0.1)
IMM GRANULOCYTES NFR BLD AUTO: 0 %
KETONES UR QL STRIP: NEGATIVE
LDLC SERPL CALC-MCNC: ABNORMAL MG/DL (ref 0–99)
LEUKOCYTE ESTERASE UR QL STRIP: NEGATIVE
LYMPHOCYTES # BLD AUTO: 2.2 X10E3/UL (ref 0.7–3.1)
LYMPHOCYTES NFR BLD AUTO: 37 %
MCH RBC QN AUTO: 31.1 PG (ref 26.6–33)
MCHC RBC AUTO-ENTMCNC: 34.3 G/DL (ref 31.5–35.7)
MCV RBC AUTO: 91 FL (ref 79–97)
MICRO URNS: NORMAL
MONOCYTES # BLD AUTO: 0.4 X10E3/UL (ref 0.1–0.9)
MONOCYTES NFR BLD AUTO: 7 %
NEUTROPHILS # BLD AUTO: 3.1 X10E3/UL (ref 1.4–7)
NEUTROPHILS NFR BLD AUTO: 51 %
NITRITE UR QL STRIP: NEGATIVE
PH UR STRIP: 6 [PH] (ref 5–7.5)
PLATELET # BLD AUTO: 281 X10E3/UL (ref 150–379)
POTASSIUM SERPL-SCNC: 5 MMOL/L (ref 3.5–5.2)
PROT SERPL-MCNC: 7.9 G/DL (ref 6–8.5)
PROT UR QL STRIP: NEGATIVE
RBC # BLD AUTO: 4.66 X10E6/UL (ref 4.14–5.8)
SODIUM SERPL-SCNC: 142 MMOL/L (ref 134–144)
SP GR UR: 1.02 (ref 1–1.03)
TRIGL SERPL-MCNC: 492 MG/DL (ref 0–149)
TSH SERPL DL<=0.005 MIU/L-ACNC: 4.86 UIU/ML (ref 0.45–4.5)
UROBILINOGEN UR STRIP-MCNC: 0.2 MG/DL (ref 0.2–1)
VLDLC SERPL CALC-MCNC: ABNORMAL MG/DL (ref 5–40)
WBC # BLD AUTO: 6.1 X10E3/UL (ref 3.4–10.8)

## 2019-03-06 LAB
HBA1C MFR BLD: 5.5 % (ref 4.8–5.6)
SPECIMEN STATUS REPORT, ROLRST: NORMAL

## 2019-03-07 ENCOUNTER — TELEPHONE (OUTPATIENT)
Dept: INTERNAL MEDICINE CLINIC | Age: 34
End: 2019-03-07

## 2019-03-07 DIAGNOSIS — I10 ESSENTIAL HYPERTENSION, BENIGN: ICD-10-CM

## 2019-03-07 DIAGNOSIS — E06.3 AUTOIMMUNE HYPOTHYROIDISM: ICD-10-CM

## 2019-03-07 DIAGNOSIS — E78.2 MIXED HYPERLIPIDEMIA: ICD-10-CM

## 2019-03-07 RX ORDER — LEVOTHYROXINE SODIUM 75 UG/1
75 TABLET ORAL
Qty: 102 TAB | Refills: 3 | Status: SHIPPED | OUTPATIENT
Start: 2019-03-07 | End: 2019-04-26 | Stop reason: SDUPTHER

## 2019-03-07 RX ORDER — ATORVASTATIN CALCIUM 40 MG/1
40 TABLET, FILM COATED ORAL DAILY
Qty: 90 TAB | Refills: 3 | Status: SHIPPED | OUTPATIENT
Start: 2019-03-07 | End: 2021-01-20 | Stop reason: ALTCHOICE

## 2019-03-07 NOTE — TELEPHONE ENCOUNTER
See my chart note   -Reviewed sx, advised probiotic, call if sx persist.  - for thyroid, slight dosage increase  - for cholesterol, Diet and exercise but will require medication. Reviewed side effects, goal of treatment and need for follow up  . Check lipid panel and appropriate studies to rule out med side effects in 6 months. High risk med management.

## 2019-03-08 DIAGNOSIS — F34.1 DYSTHYMIA: ICD-10-CM

## 2019-03-11 RX ORDER — ESCITALOPRAM OXALATE 20 MG/1
20 TABLET ORAL DAILY
Qty: 30 TAB | Refills: 5 | Status: SHIPPED | OUTPATIENT
Start: 2019-03-11 | End: 2019-08-12 | Stop reason: SDUPTHER

## 2019-03-11 NOTE — TELEPHONE ENCOUNTER
escitalopram oxalate (LEXAPRO) 20 mg tablet         Sig: Take 1 Tab by mouth daily.     Disp:  30 Tab    Refills:  5    Start: 3/8/2019    Class: Normal    For: Dysthymia    Last ordered: 7 months ago by Derian Cabello MD        To be filled at: [facility]/pharmacy #[id number], [address]

## 2019-03-11 NOTE — TELEPHONE ENCOUNTER
PCP: John Pearson MD    Last appt: 2/26/2019  Future Appointments   Date Time Provider Lou Fernandez   8/26/2019  9:00 AM Isaias Teixeira MD [id number] [facility]       Requested Prescriptions     Pending Prescriptions Disp Refills    escitalopram oxalate (LEXAPRO) 20 mg tablet 30 Tab 5     Sig: Take 1 Tab by mouth daily.

## 2019-04-26 ENCOUNTER — TELEPHONE (OUTPATIENT)
Dept: INTERNAL MEDICINE CLINIC | Age: 34
End: 2019-04-26

## 2019-04-26 DIAGNOSIS — I10 ESSENTIAL HYPERTENSION, BENIGN: ICD-10-CM

## 2019-04-26 DIAGNOSIS — E06.3 AUTOIMMUNE HYPOTHYROIDISM: ICD-10-CM

## 2019-04-26 DIAGNOSIS — E78.2 MIXED HYPERLIPIDEMIA: ICD-10-CM

## 2019-04-26 RX ORDER — LEVOTHYROXINE SODIUM 75 UG/1
75 TABLET ORAL
Qty: 102 TAB | Refills: 2 | Status: SHIPPED | OUTPATIENT
Start: 2019-04-26 | End: 2019-11-27 | Stop reason: SDUPTHER

## 2019-08-12 DIAGNOSIS — I10 ESSENTIAL HYPERTENSION, BENIGN: ICD-10-CM

## 2019-08-12 DIAGNOSIS — F34.1 DYSTHYMIA: ICD-10-CM

## 2019-08-12 RX ORDER — ESCITALOPRAM OXALATE 20 MG/1
20 TABLET ORAL DAILY
Qty: 30 TAB | Refills: 1 | Status: SHIPPED | OUTPATIENT
Start: 2019-08-12 | End: 2019-08-19 | Stop reason: SDUPTHER

## 2019-08-15 RX ORDER — LOSARTAN POTASSIUM AND HYDROCHLOROTHIAZIDE 12.5; 5 MG/1; MG/1
TABLET ORAL
Qty: 90 TAB | Refills: 0 | Status: SHIPPED | OUTPATIENT
Start: 2019-08-15 | End: 2019-08-28 | Stop reason: ALTCHOICE

## 2019-08-19 DIAGNOSIS — F34.1 DYSTHYMIA: ICD-10-CM

## 2019-08-19 RX ORDER — ESCITALOPRAM OXALATE 20 MG/1
20 TABLET ORAL DAILY
Qty: 90 TAB | Refills: 0 | Status: SHIPPED | OUTPATIENT
Start: 2019-08-19 | End: 2019-12-26

## 2019-08-28 ENCOUNTER — OFFICE VISIT (OUTPATIENT)
Dept: INTERNAL MEDICINE CLINIC | Age: 34
End: 2019-08-28

## 2019-08-28 VITALS
WEIGHT: 188.6 LBS | RESPIRATION RATE: 16 BRPM | BODY MASS INDEX: 25 KG/M2 | DIASTOLIC BLOOD PRESSURE: 78 MMHG | OXYGEN SATURATION: 97 % | HEART RATE: 73 BPM | SYSTOLIC BLOOD PRESSURE: 122 MMHG | TEMPERATURE: 98.1 F | HEIGHT: 73 IN

## 2019-08-28 DIAGNOSIS — I10 ESSENTIAL HYPERTENSION, BENIGN: ICD-10-CM

## 2019-08-28 DIAGNOSIS — F34.1 DYSTHYMIA: ICD-10-CM

## 2019-08-28 DIAGNOSIS — E06.3 AUTOIMMUNE HYPOTHYROIDISM: Primary | ICD-10-CM

## 2019-08-28 DIAGNOSIS — E78.2 MIXED HYPERLIPIDEMIA: ICD-10-CM

## 2019-08-28 NOTE — PROGRESS NOTES
HISTORY OF PRESENT ILLNESS  Jai Wills is a 26 y.o. male. HPI Subjective:  Gale Burroughs is seen today for follow up of hypothyroidism and other concerns. 1. Weight loss. He has lost about 60 pounds. This was planned and he has carried this out with diet and exercise. Will check labs including thyroid studies to determine if he needs a dosage adjustment. Reviewed with him that he likely will require treatment on a chronic basis. 2. FADI. CPAP  no longer required. 3. Dysthymia, stable. He would like to continue with therapy, which I think is appropriate. 4. Hypertension. Discontinue Losartan. I have asked him to check some home readings and send me a [employer] message in a couple weeks. Review of Systems:  Notable for decreased BM frequency, but not constipated. He attributes this to his dietary changes and I agree. Habits:  He is sober for 16 months. Social History:  Notable for having his second child, a [de-identified] old. Things are going well at home. Review of Systems   Constitutional: Positive for weight loss. Respiratory: Negative. Cardiovascular: Negative for chest pain, palpitations, leg swelling and PND. Gastrointestinal: Negative for blood in stool. Musculoskeletal: Negative for myalgias. Neurological: Negative for focal weakness. Physical Exam   Constitutional: No distress. Neck: Carotid bruit is not present. Cardiovascular: Normal rate and regular rhythm. Exam reveals no gallop and no friction rub. No murmur heard. Pulmonary/Chest: Effort normal and breath sounds normal. No respiratory distress. Musculoskeletal: He exhibits no edema. Neurological: He is alert. Skin: Skin is warm and dry. Psychiatric: He has a normal mood and affect. His behavior is normal.   Nursing note and vitals reviewed. ASSESSMENT and PLAN  Diagnoses and all orders for this visit:    1. Autoimmune hypothyroidism  -     TSH 3RD GENERATION  -     T4, FREE    2.  Dysthymia- Continue current regimen of prescription and / or OTC medications     3. Essential hypertension, benign  -     METABOLIC PANEL, COMPREHENSIVE  -     URINALYSIS W/ RFLX MICROSCOPIC    4.  Mixed hyperlipidemia  -     METABOLIC PANEL, COMPREHENSIVE  -     LIPID PANEL

## 2019-08-28 NOTE — PROGRESS NOTES
Pt has been exercising and dieting x 5 months - has lost 60 lbs. Wants to discuss meds and getting labs.

## 2019-09-06 LAB
ALBUMIN SERPL-MCNC: 4.6 G/DL (ref 3.5–5.5)
ALBUMIN/GLOB SERPL: 1.8 {RATIO} (ref 1.2–2.2)
ALP SERPL-CCNC: 76 IU/L (ref 39–117)
ALT SERPL-CCNC: 23 IU/L (ref 0–44)
APPEARANCE UR: CLEAR
AST SERPL-CCNC: 19 IU/L (ref 0–40)
BACTERIA #/AREA URNS HPF: ABNORMAL /[HPF]
BILIRUB SERPL-MCNC: 0.4 MG/DL (ref 0–1.2)
BILIRUB UR QL STRIP: NEGATIVE
BUN SERPL-MCNC: 17 MG/DL (ref 6–20)
BUN/CREAT SERPL: 17 (ref 9–20)
CALCIUM SERPL-MCNC: 10.2 MG/DL (ref 8.7–10.2)
CASTS URNS MICRO: ABNORMAL
CASTS URNS QL MICRO: PRESENT /LPF
CHLORIDE SERPL-SCNC: 104 MMOL/L (ref 96–106)
CHOLEST SERPL-MCNC: 100 MG/DL (ref 100–199)
CO2 SERPL-SCNC: 24 MMOL/L (ref 20–29)
COLOR UR: YELLOW
CREAT SERPL-MCNC: 0.99 MG/DL (ref 0.76–1.27)
EPI CELLS #/AREA URNS HPF: ABNORMAL /HPF (ref 0–10)
GLOBULIN SER CALC-MCNC: 2.6 G/DL (ref 1.5–4.5)
GLUCOSE SERPL-MCNC: 98 MG/DL (ref 65–99)
GLUCOSE UR QL: NEGATIVE
HDLC SERPL-MCNC: 32 MG/DL
HGB UR QL STRIP: NEGATIVE
KETONES UR QL STRIP: ABNORMAL
LDLC SERPL CALC-MCNC: 54 MG/DL (ref 0–99)
LEUKOCYTE ESTERASE UR QL STRIP: NEGATIVE
MICRO URNS: ABNORMAL
MUCOUS THREADS URNS QL MICRO: PRESENT
NITRITE UR QL STRIP: NEGATIVE
PH UR STRIP: 7.5 [PH] (ref 5–7.5)
POTASSIUM SERPL-SCNC: 5.4 MMOL/L (ref 3.5–5.2)
PROT SERPL-MCNC: 7.2 G/DL (ref 6–8.5)
PROT UR QL STRIP: ABNORMAL
RBC #/AREA URNS HPF: ABNORMAL /HPF (ref 0–2)
SODIUM SERPL-SCNC: 143 MMOL/L (ref 134–144)
SP GR UR: >=1.03 (ref 1–1.03)
T4 FREE SERPL-MCNC: 1.25 NG/DL (ref 0.82–1.77)
TRIGL SERPL-MCNC: 68 MG/DL (ref 0–149)
TSH SERPL DL<=0.005 MIU/L-ACNC: 3.55 UIU/ML (ref 0.45–4.5)
UROBILINOGEN UR STRIP-MCNC: 1 MG/DL (ref 0.2–1)
VLDLC SERPL CALC-MCNC: 14 MG/DL (ref 5–40)
WBC #/AREA URNS HPF: ABNORMAL /HPF (ref 0–5)

## 2019-12-26 DIAGNOSIS — F34.1 DYSTHYMIA: ICD-10-CM

## 2019-12-26 RX ORDER — ESCITALOPRAM OXALATE 20 MG/1
TABLET ORAL
Qty: 90 TAB | Refills: 0 | Status: SHIPPED | OUTPATIENT
Start: 2019-12-26 | End: 2020-07-02

## 2019-12-31 DIAGNOSIS — I10 ESSENTIAL HYPERTENSION, BENIGN: ICD-10-CM

## 2019-12-31 RX ORDER — LOSARTAN POTASSIUM AND HYDROCHLOROTHIAZIDE 12.5; 5 MG/1; MG/1
TABLET ORAL
Qty: 90 TAB | Refills: 0 | Status: SHIPPED | OUTPATIENT
Start: 2019-12-31 | End: 2021-01-20 | Stop reason: ALTCHOICE

## 2020-03-07 DIAGNOSIS — E78.2 MIXED HYPERLIPIDEMIA: ICD-10-CM

## 2020-03-07 DIAGNOSIS — I10 ESSENTIAL HYPERTENSION, BENIGN: ICD-10-CM

## 2020-03-07 DIAGNOSIS — E06.3 AUTOIMMUNE HYPOTHYROIDISM: ICD-10-CM

## 2020-03-08 RX ORDER — LEVOTHYROXINE SODIUM 75 UG/1
75 TABLET ORAL
Qty: 102 TAB | Refills: 0 | Status: SHIPPED | OUTPATIENT
Start: 2020-03-08 | End: 2020-07-02

## 2020-07-02 DIAGNOSIS — E06.3 AUTOIMMUNE HYPOTHYROIDISM: ICD-10-CM

## 2020-07-02 DIAGNOSIS — F34.1 DYSTHYMIA: ICD-10-CM

## 2020-07-02 DIAGNOSIS — E78.2 MIXED HYPERLIPIDEMIA: ICD-10-CM

## 2020-07-02 DIAGNOSIS — I10 ESSENTIAL HYPERTENSION, BENIGN: ICD-10-CM

## 2020-07-02 RX ORDER — ESCITALOPRAM OXALATE 20 MG/1
TABLET ORAL
Qty: 30 TAB | Refills: 0 | Status: SHIPPED | OUTPATIENT
Start: 2020-07-02 | End: 2020-08-04 | Stop reason: SDUPTHER

## 2020-07-02 RX ORDER — LEVOTHYROXINE SODIUM 75 UG/1
75 TABLET ORAL
Qty: 36 TAB | Refills: 0 | Status: SHIPPED | OUTPATIENT
Start: 2020-07-02 | End: 2021-01-20 | Stop reason: SDUPTHER

## 2020-08-30 NOTE — MR AVS SNAPSHOT
[address] [facility] [address] [facility] 
[phone] Patient: Isabel Cantrell MRN: [id number] RHL:[id number] Visit Information Date & Time Provider Department Dept. Phone Encounter #  
 2/1/2018  9:35 AM Vincent Marie MD [address] Internal Medicine [phone] [id number] Follow-up Instructions Return if symptoms worsen or fail to improve. Your Appointments 4/18/2018  4:35 PM  
ROUTINE CARE with Vincent Marie MD  
[address] Internal Medicine [address] [place] [address]) Appt Note: f/u  
 [address] [facility] [address]  
Sherita Child [address] [place] [address]  
  
    
 11/21/2018  3:50 PM  
COMPLETE PHYSICAL with Vincent Marie MD  
[address] Internal Medicine [address] [place] [address]) Appt Note: CPE (11/20/17)  
 [address] [facility] [address]  
[address] [facility] Upcoming Health Maintenance Date Due DTaP/Tdap/Td series (2 - Td) 11/20/2027 Allergies as of 2/1/2018  Review Complete On: 2/1/2018 By: Vincent Marie MD  
 No Known Allergies Current Immunizations  Reviewed on 11/20/2017 Name Date Influenza Vaccine 1/6/2017, 10/31/2013 Td 1/1/2010 Tdap 11/20/2017 Not reviewed this visit You Were Diagnosed With   
  
 Codes Comments Viral gastroenteritis    -  Primary ICD-10-CM: A08.4 ICD-9-CM: 066. 8 Flu-like symptoms     ICD-10-CM: R68.89 ICD-9-CM: 780.99 Vitals BP Pulse Temp Resp Height(growth percentile) Weight(growth percentile) (!) 148/104 (BP 1 Location: Right arm, BP Patient Position: Sitting) (!) 118 98.7 °F (37.1 °C) (Oral) 16 6' 1\" (1.854 m) 239 lb (108.4 kg) SpO2 BMI Smoking Status 97% 31.53 kg/m2 Never Smoker BMI and BSA Data  Body Mass Index Body Surface Area  
 31.53 kg/m 2 2.36 m 2  
  
  
 Left a written message/notification for the  in her binder to help this patient with outpatient follow-up infusions for rabies vaccines. Preferred Pharmacy Pharmacy Name Phone [facility]/PHARMACY #[id number] [facility] [phone] Your Updated Medication List  
  
   
This list is accurate as of: 2/1/18 10:36 AM.  Always use your most recent med list.  
  
  
  
  
 levothyroxine 75 mcg tablet Commonly known as:  SYNTHROID Take 1 Tab by mouth Daily (before breakfast). Indications: Hashimoto Thyroiditis  
  
 losartan-hydroCHLOROthiazide 50-12.5 mg per tablet Commonly known as:  HYZAAR  
TAKE 1 TABLET BY MOUTH EVERY DAY  
  
 ondansetron 8 mg disintegrating tablet Commonly known as:  ZOFRAN ODT Take 1 Tab by mouth every eight (8) hours as needed for Nausea. VICKS DAYQUIL PO Take  by mouth. Prescriptions Sent to Pharmacy Refills  
 ondansetron (ZOFRAN ODT) 8 mg disintegrating tablet 0 Sig: Take 1 Tab by mouth every eight (8) hours as needed for Nausea. Class: Normal  
 Pharmacy: [address] #: [phone] Route: Oral  
  
We Performed the Following AMB POC RAPID INFLUENZA TEST [81643 CPT(R)] Follow-up Instructions Return if symptoms worsen or fail to improve. Patient Instructions Gastroenteritis: Care Instructions Your Care Instructions Gastroenteritis is an illness that may cause nausea, vomiting, and diarrhea. It is sometimes called \"stomach flu. \" It can be caused by bacteria or a virus. You will probably begin to feel better in 1 to 2 days. In the meantime, get plenty of rest and make sure you do not become dehydrated. Dehydration occurs when your body loses too much fluid. Follow-up care is a key part of your treatment and safety. Be sure to make and go to all appointments, and call your doctor if you are having problems. It's also a good idea to know your test results and keep a list of the medicines you take. How can you care for yourself at home? · If your doctor prescribed antibiotics, take them as directed. Do not stop taking them just because you feel better. You need to take the full course of antibiotics. · Drink plenty of fluids to prevent dehydration, enough so that your urine is light yellow or clear like water. Choose water and other caffeine-free clear liquids until you feel better. If you have kidney, heart, or liver disease and have to limit fluids, talk with your doctor before you increase your fluid intake. · Drink fluids slowly, in frequent, small amounts, because drinking too much too fast can cause vomiting. · Begin eating mild foods, such as dry toast, yogurt, applesauce, bananas, and rice. Avoid spicy, hot, or high-fat foods, and do not drink alcohol or caffeine for a day or two. Do not drink milk or eat ice cream until you are feeling better. How to prevent gastroenteritis · Keep hot foods hot and cold foods cold. · Do not eat meats, dressings, salads, or other foods that have been kept at room temperature for more than 2 hours. · Use a thermometer to check your refrigerator. It should be between 34°F and 40°F. 
· Defrost meats in the refrigerator or microwave, not on the kitchen counter. · Keep your hands and your kitchen clean. Wash your hands, cutting boards, and countertops with hot soapy water frequently. · Cook meat until it is well done. · Do not eat raw eggs or uncooked sauces made with raw eggs. · Do not take chances. If food looks or tastes spoiled, throw it out. When should you call for help? Call 911 anytime you think you may need emergency care. For example, call if: 
? · You vomit blood or what looks like coffee grounds. ? · You passed out (lost consciousness). ? · You pass maroon or very bloody stools. ?Call your doctor now or seek immediate medical care if: 
? · You have severe belly pain. ? · You have signs of needing more fluids.  You have sunken eyes, a dry mouth, and pass only a little dark urine. ? · You feel like you are going to faint. ? · You have increased belly pain that does not go away in 1 to 2 days. ? · You have new or increased nausea, or you are vomiting. ? · You have a new or higher fever. ? · Your stools are black and tarlike or have streaks of blood. ? Watch closely for changes in your health, and be sure to contact your doctor if: 
? · You are dizzy or lightheaded. ? · You urinate less than usual, or your urine is dark yellow or brown. ? · You do not feel better with each day that goes by. Where can you learn more? Go to http://italia-andrea.info/. Enter N142 in the search box to learn more about \"Gastroenteritis: Care Instructions. \" Current as of: March 3, 2017 Content Version: 11.4 © 6891-2738 [employer]. Care instructions adapted under license by [employer] (which disclaims liability or warranty for this information). If you have questions about a medical condition or this instruction, always ask your healthcare professional. [place] [address] any warranty or liability for your use of this information. Introducing [facility]! Dear Kiara Cuellar: Thank you for requesting a [employer] account. Our records indicate that you already have an active [employer] account. You can access your account anytime at [email or web address] Did you know that you can access your hospital and ER discharge instructions at any time in [employer]? You can also review all of your test results from your hospital stay or ER visit. Additional Information If you have questions, please visit the Frequently Asked Questions section of the [employer] website at [email or web address] Remember, [employer] is NOT to be used for urgent needs. For medical emergencies, dial 911. Now available from your iPhone and Android! Please provide this summary of care documentation to your next provider. Your primary care clinician is listed as MIAH DENT. If you have any questions after today's visit, please call [phone].

## 2021-01-20 ENCOUNTER — OFFICE VISIT (OUTPATIENT)
Dept: INTERNAL MEDICINE CLINIC | Age: 36
End: 2021-01-20
Payer: COMMERCIAL

## 2021-01-20 VITALS
SYSTOLIC BLOOD PRESSURE: 130 MMHG | RESPIRATION RATE: 20 BRPM | DIASTOLIC BLOOD PRESSURE: 83 MMHG | HEIGHT: 73 IN | BODY MASS INDEX: 25.6 KG/M2 | OXYGEN SATURATION: 98 % | HEART RATE: 69 BPM | WEIGHT: 193.2 LBS | TEMPERATURE: 97.5 F

## 2021-01-20 DIAGNOSIS — F34.1 DYSTHYMIA: ICD-10-CM

## 2021-01-20 DIAGNOSIS — I10 ESSENTIAL HYPERTENSION, BENIGN: ICD-10-CM

## 2021-01-20 DIAGNOSIS — Z00.00 ROUTINE GENERAL MEDICAL EXAMINATION AT A HEALTH CARE FACILITY: Primary | ICD-10-CM

## 2021-01-20 DIAGNOSIS — E78.2 MIXED HYPERLIPIDEMIA: ICD-10-CM

## 2021-01-20 DIAGNOSIS — F32.5 MAJOR DEPRESSION IN REMISSION (HCC): ICD-10-CM

## 2021-01-20 DIAGNOSIS — E06.3 AUTOIMMUNE HYPOTHYROIDISM: ICD-10-CM

## 2021-01-20 PROCEDURE — 99395 PREV VISIT EST AGE 18-39: CPT | Performed by: INTERNAL MEDICINE

## 2021-01-20 RX ORDER — LEVOTHYROXINE SODIUM 75 UG/1
75 TABLET ORAL
Qty: 36 TAB | Refills: 0 | Status: SHIPPED | OUTPATIENT
Start: 2021-01-20 | End: 2021-02-01

## 2021-01-20 RX ORDER — ESCITALOPRAM OXALATE 20 MG/1
TABLET ORAL
Qty: 90 TAB | Refills: 3 | Status: SHIPPED | OUTPATIENT
Start: 2021-01-20 | End: 2021-09-21

## 2021-01-20 NOTE — PROGRESS NOTES
HISTORY OF PRESENT ILLNESS  Nicola Camara is a 38 y.o. male. HPI Al Chavira is seen today for a complete exam and follow up of chronic problems which is overdue. Preventive Medicine. He is due for labs and vaccinations. Chronic Problems Reviewed. He takes his levothyroxine and Lexapro. He has stopped Lipitor and Hyzaar. Blood pressure is fine without medication treatment. We will check his cholesterol. Reviewed with him there may be a genetic component as his readings have not improved that much with lifestyle changes. Habits. Notable for no alcohol in three years and he is congratulated on his efforts. Social History. 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 His third child is on the way and congratulations were offered. .We counseled regarding healthy lifestyle issues including diet, exercise and stress management. Family history, social history, etc. Are reviewed and updated, see electronic record. Review of Systems   Constitutional: Negative for weight loss. Respiratory: Negative. Cardiovascular: Negative for chest pain, palpitations, leg swelling and PND. Gastrointestinal: Negative. Genitourinary: Negative. Musculoskeletal: Negative for myalgias. Neurological: Negative for focal weakness. Psychiatric/Behavioral: Negative for depression. The patient is not nervous/anxious. Physical Exam  Vitals signs and nursing note reviewed. Constitutional:       General: He is not in acute distress. Appearance: He is well-developed. HENT:      Head: Normocephalic and atraumatic. Right Ear: Tympanic membrane, ear canal and external ear normal.      Left Ear: Tympanic membrane, ear canal and external ear normal.   Eyes:      General:         Right eye: No discharge. Left eye: No discharge. Pupils: Pupils are equal, round, and reactive to light.    Neck:      Musculoskeletal: Normal range of motion and neck supple. Thyroid: No thyromegaly. Vascular: No carotid bruit. Cardiovascular:      Rate and Rhythm: Normal rate and regular rhythm. Heart sounds: Normal heart sounds. No murmur. No friction rub. No gallop. Pulmonary:      Effort: Pulmonary effort is normal. No respiratory distress. Breath sounds: Normal breath sounds. No wheezing or rales. Abdominal:      General: Bowel sounds are normal. There is no distension. Palpations: Abdomen is soft. There is no mass. Tenderness: There is no abdominal tenderness. There is no guarding or rebound. Musculoskeletal: Normal range of motion. General: No tenderness. Lymphadenopathy:      Cervical: No cervical adenopathy. Skin:     General: Skin is warm and dry. Findings: No rash. Neurological:      Mental Status: He is alert and oriented to person, place, and time. Deep Tendon Reflexes: Reflexes are normal and symmetric. Psychiatric:         Behavior: Behavior normal.         ASSESSMENT and PLAN  Diagnoses and all orders for this visit:    1. Routine general medical examination at a health care facility  -     CBC WITH AUTOMATED DIFF; Future  -     METABOLIC PANEL, COMPREHENSIVE; Future  -     LIPID PANEL; Future  -     TSH 3RD GENERATION; Future  -     URINALYSIS W/ RFLX MICROSCOPIC; Future    2. Autoimmune hypothyroidism  -     levothyroxine (SYNTHROID) 75 mcg tablet; Take 1 Tab by mouth Daily (before breakfast). Take 2 on Sunday    3. Essential hypertension, benign  -     levothyroxine (SYNTHROID) 75 mcg tablet; Take 1 Tab by mouth Daily (before breakfast). Take 2 on Sunday    4. Dysthymia  -     escitalopram oxalate (LEXAPRO) 20 mg tablet; TAKE 1 TABLET BY MOUTH EVERY DAY    5. [facility]    6. Mixed hyperlipidemia  -     levothyroxine (SYNTHROID) 75 mcg tablet; Take 1 Tab by mouth Daily (before breakfast).  Take 2 on Sunday

## 2021-01-26 LAB
ALB/GLOBRATIO, 58C: 1.9 (CALC) (ref 1–2.5)
ALBUMIN SERPL-MCNC: 4.9 G/DL (ref 3.6–5.1)
ALKALINE PHOSPHATASE, TOTAL, 25002000: 63 U/L (ref 36–130)
ALT SERPL-CCNC: 19 U/L (ref 9–46)
APPEARANCE UR: CLEAR
AST SERPL W P-5'-P-CCNC: 19 U/L (ref 10–40)
BASOPHILS # BLD: 72 CELLS/UL (ref 0–200)
BASOPHILS NFR BLD: 1.3 %
BILIRUB SERPL-MCNC: 0.8 MG/DL (ref 0.2–1.2)
BILIRUB UR QL: NEGATIVE
BILIRUB UR QL: NEGATIVE
BUN SERPL-MCNC: 19 MG/DL (ref 7–25)
BUN/CREATININE RATIO,BUCR: NORMAL (CALC) (ref 6–22)
CALCIUM SERPL-MCNC: 10 MG/DL (ref 8.6–10.3)
CHLORIDE SERPL-SCNC: 103 MMOL/L (ref 98–110)
CHOL/HDL RATIO,CHHDX: 4.1 (CALC)
CHOLEST SERPL-MCNC: 202 MG/DL
CO2 SERPL-SCNC: 29 MMOL/L (ref 20–32)
COLOR UR: YELLOW
CREAT SERPL-MCNC: 0.96 MG/DL (ref 0.6–1.35)
EOSINOPHIL # BLD: 121 CELLS/UL (ref 15–500)
EOSINOPHIL NFR BLD: 2.2 %
ERYTHROCYTE [DISTWIDTH] IN BLOOD BY AUTOMATED COUNT: 13 % (ref 11–15)
GLOBULIN,GLOB: 2.6 G/DL (CALC) (ref 1.9–3.7)
GLUCOSE SERPL-MCNC: 90 MG/DL (ref 65–99)
HCT VFR BLD AUTO: 43.8 % (ref 38.5–50)
HDLC SERPL-MCNC: 49 MG/DL
HGB BLD-MCNC: 15.2 G/DL (ref 13.2–17.1)
HGB UR QL STRIP: NEGATIVE
KETONES UR QL STRIP.AUTO: NEGATIVE
LDL-CHOLESTEROL: 135 MG/DL (CALC)
LEUKOCYTE ESTERASE: NEGATIVE
LYMPHOCYTES # BLD: 1975 CELLS/UL (ref 850–3900)
LYMPHOCYTES NFR BLD: 35.9 %
MCH RBC QN AUTO: 30.5 PG (ref 27–33)
MCHC RBC AUTO-ENTMCNC: 34.7 G/DL (ref 32–36)
MCV RBC AUTO: 87.8 FL (ref 80–100)
MONOCYTES # BLD: 451 CELLS/UL (ref 200–950)
MONOCYTES NFR BLD: 8.2 %
NEUTROPHILS # BLD AUTO: 2882 CELLS/UL (ref 1500–7800)
NEUTROPHILS # BLD: 52.4 %
NITRITE UR QL STRIP.AUTO: NEGATIVE
NON-HDL CHOLESTEROL, 011976: 153 MG/DL (CALC)
PH UR STRIP: 7.5 [PH] (ref 5–8)
PLATELET # BLD AUTO: 230 THOUSAND/UL (ref 140–400)
PMV BLD AUTO: 12.4 FL (ref 7.5–12.5)
POTASSIUM SERPL-SCNC: 4.4 MMOL/L (ref 3.5–5.3)
PROT SERPL-MCNC: 7.5 G/DL (ref 6.1–8.1)
PROT UR STRIP-MCNC: NEGATIVE MG/DL
RBC # BLD AUTO: 4.99 MILLION/UL (ref 4.2–5.8)
SODIUM SERPL-SCNC: 139 MMOL/L (ref 135–146)
SP GR UR REFRACTOMETRY: 1.02 (ref 1–1.03)
TRIGL SERPL-MCNC: 79 MG/DL (ref ?–150)
TSH SERPL DL<=0.005 MIU/L-ACNC: 6.42 MIU/L (ref 0.4–4.5)
WBC # BLD AUTO: 5.5 THOUSAND/UL (ref 3.8–10.8)

## 2021-02-01 DIAGNOSIS — E06.3 AUTOIMMUNE HYPOTHYROIDISM: ICD-10-CM

## 2021-02-01 DIAGNOSIS — E78.2 MIXED HYPERLIPIDEMIA: ICD-10-CM

## 2021-02-01 DIAGNOSIS — I10 ESSENTIAL HYPERTENSION, BENIGN: ICD-10-CM

## 2021-02-01 RX ORDER — LEVOTHYROXINE SODIUM 75 UG/1
75 TABLET ORAL
Qty: 38 TAB | Refills: 0
Start: 2021-02-01 | End: 2021-02-18

## 2021-07-27 DIAGNOSIS — E06.3 AUTOIMMUNE HYPOTHYROIDISM: Primary | ICD-10-CM

## 2021-08-03 NOTE — CONSULTS
[address]    Orville Johnston  MR#: [id number]  : 1985  ACCOUNT #: [de-identified]   DATE OF SERVICE: 2018    PRIMARY CARE PHYSICIAN:  Rere Harris MD     REFERRING PHYSICIAN:  Wally Dumont MD    REASON FOR MEDICAL CONSULT:  Medical management of hypertension, hypothyroidism and a routine history and physical required for admission to the psychiatric unit. CHIEF COMPLAINT:  None. HISTORY OF PRESENT ILLNESS:  This is a 25-year-old man with a past medical history significant for depression, hypothyroidism, hypertension, recent diagnosis of obstructive sleep apnea, was admitted to the psychiatric unit because of suicidal ideation. Medical consult was requested for routine history and physical required for admission into the psychiatric unit and management of hypertension, hypothyroidism and obstructive sleep apnea. The patient has no specific medical complaints at this time. Patient denies chest pain, no fever, no rigors, no chills. PAST MEDICAL HISTORY:  Depression, hypertension, dyslipidemia, obstructive sleep apnea. ALLERGIES:  NO KNOWN DRUG ALLERGIES. MEDICATIONS:  Lexapro 10 mg daily, Synthroid 75 mcg daily, Hyzaar 50/12.5 one tablet daily, Zofran 8 mg q.8 hour as needed for nausea. FAMILY HISTORY:  This was reviewed. Father has hypertension, diabetes, thyroid disease, multiple myeloma. His mother has a skin disorder. PAST SURGICAL HISTORY:  Significant for wisdom tooth extraction, ACL reconstruction. SOCIAL HISTORY:  Patient is a former smoker with tobacco abuse in 2010 and denies alcohol abuse, but the patient's alcohol level in the emergency room was elevated. REVIEW OF SYSTEMS:  HEAD, EYES, EARS, NOSE AND THROAT:  No headache, no dizziness, no blurring of vision, no photophobia. RESPIRATORY:  No cough, no shortness of breath, no hemoptysis.   CARDIOVASCULAR:  No chest pain, no orthopnea, no palpitation. GASTROINTESTINAL SYSTEM:  No nausea and vomiting, no diarrhea, no constipation. GENITOURINARY SYSTEM:  No dysuria, no urgency and no frequency. All other systems are reviewed and they are negative. PHYSICAL EXAMINATION:  GENERAL:  The patient is stable, in no acute distress. VITAL SIGNS:  Temperature of 98.2, pulse 91, blood pressure 119/79, respiratory rate 18, oxygen saturation 95%. HEENT:  Normocephalic, atraumatic. Eyes, normal eye movements. No redness, no drainage, no discharge. Ears:  Normal external ears with no obvious drainage. Nose:  No deformity, no drainage. Mouth and throat:  No visible oral lesions. NECK:  Supple, no JVD, no thyromegaly. CHEST:  Clear breath sounds. No wheezing, no crackles. HEART:  Normal S1 and S2, regular. No clinically appreciable murmur. ABDOMEN:  Soft, nontender. Normal bowel sounds. CENTRAL NERVOUS SYSTEM:  Alert, oriented x 3. No gross focal neurological deficits. EXTREMITIES:  No edema. Pulses 2+ bilaterally. MUSCULOSKELETAL:  No obvious joint deformity or swelling. SKIN:  No active skin lesions seen in the exposed part of the body. PSYCHIATRY:  Unable to assess mood and affect. LYMPHATIC SYSTEM:  No cervical lymphadenopathy. ADMITTING DATA:  None. LABORATORY DATA:  TSH level 2.87. Chemistry:  Sodium 139, potassium 3.6, chloride 101, CO2 of 24, glucose 141, BUN 25, creatinine 1.28, calcium 8.4, bilirubin total 0.7, , reported as a hemolyzed specimen. AST 69, also reported as hemolyzed specimen. Urinalysis, this is significant for negative nitrite, negative leuk esterase, negative bacteria. Urine drug screen negative. Serum alcohol level 321. Acetaminophen level less than 2, salicylate level less than 1.7. Hematology:  WBC 7.4, hemoglobin 17.3, hematocrit 48.3, platelet 123. ASSESSMENT:  1.  Suicidal ideations. 2.  Hypertension. 3.  Hypothyroidism. 4.  Obstructive sleep apnea. 5.  Hyperglycemia.   6. Abnormal liver function test.    PLAN:  1. Suicidal ideation. Patient will continue evaluation and treatment for suicidal ideation as per psychiatric service. This is the reason why the patient was admitted to the psychiatric unit. 2.  Hypertension. We will resume home medication. Monitor the patient's blood pressure closely. 3.  Hypothyroidism. This is well controlled on Synthroid. TSH is within normal limit. 4.  Obstructive sleep apnea. This is a recent diagnosis. We will continue to monitor. Patient is not on CPAP at present. 5.  Hyperglycemia. Will check hemoglobin A1c level. The patient has no history of diabetes. 6.  Abnormal liver function tests. This was reported as a hemolyzed specimen. We will repeat labs. In summary, this is a 75-year-old man who was admitted to the psychiatric service for evaluation and treatment of suicidal ideation. Medical consult was requested for routine history and physical required for admission to the psychiatric unit and also for medical management of the patient's medical problems. The management of the patient's medical problems are as stated above. Thank you for involving the Hospitalist Service in the care of this patient. We will continue to follow the patient alongside with the psychiatric service. There is 1 hour spent on this medical consult.       Janeen Mckeon MD       RE / LN  D: 04/19/2018 04:53     T: 04/19/2018 09:51  JOB #: [id number]  CC: Dwayne Velez MD  CC: Marisel Vasquez MD No

## 2021-08-10 DIAGNOSIS — E06.3 AUTOIMMUNE HYPOTHYROIDISM: ICD-10-CM

## 2021-08-10 DIAGNOSIS — F34.1 DYSTHYMIA: ICD-10-CM

## 2021-08-10 DIAGNOSIS — I10 ESSENTIAL HYPERTENSION, BENIGN: ICD-10-CM

## 2021-08-10 DIAGNOSIS — E78.2 MIXED HYPERLIPIDEMIA: ICD-10-CM

## 2021-08-10 RX ORDER — LEVOTHYROXINE SODIUM 75 UG/1
TABLET ORAL
Qty: 38 TABLET | Refills: 5 | Status: SHIPPED | OUTPATIENT
Start: 2021-08-10

## 2021-08-26 DIAGNOSIS — E06.3 AUTOIMMUNE HYPOTHYROIDISM: Primary | ICD-10-CM

## 2021-09-16 LAB — TSH SERPL DL<=0.005 MIU/L-ACNC: 4.38 UIU/ML (ref 0.45–4.5)

## 2021-09-21 DIAGNOSIS — I10 ESSENTIAL HYPERTENSION, BENIGN: ICD-10-CM

## 2021-09-21 DIAGNOSIS — F34.1 DYSTHYMIA: ICD-10-CM

## 2021-09-21 DIAGNOSIS — E78.2 MIXED HYPERLIPIDEMIA: ICD-10-CM

## 2021-09-21 DIAGNOSIS — E06.3 AUTOIMMUNE HYPOTHYROIDISM: ICD-10-CM

## 2021-09-21 RX ORDER — ESCITALOPRAM OXALATE 20 MG/1
TABLET ORAL
Qty: 90 TABLET | Refills: 0 | Status: SHIPPED | OUTPATIENT
Start: 2021-09-21 | End: 2021-09-23 | Stop reason: SDUPTHER

## 2021-09-23 DIAGNOSIS — F34.1 DYSTHYMIA: ICD-10-CM

## 2021-09-23 RX ORDER — ESCITALOPRAM OXALATE 20 MG/1
TABLET ORAL
Qty: 90 TABLET | Refills: 1 | Status: SHIPPED | OUTPATIENT
Start: 2021-09-23

## 2022-03-18 PROBLEM — E78.2 MIXED HYPERLIPIDEMIA: Status: ACTIVE | Noted: 2018-01-18

## 2022-03-18 PROBLEM — F10.21 ALCOHOLISM IN REMISSION (HCC): Status: ACTIVE | Noted: 2018-08-12

## 2022-03-18 PROBLEM — F32.5 MAJOR DEPRESSION IN REMISSION (HCC): Status: ACTIVE | Noted: 2018-04-25

## 2022-03-19 PROBLEM — E06.3 AUTOIMMUNE HYPOTHYROIDISM: Status: ACTIVE | Noted: 2018-01-18

## 2022-03-19 PROBLEM — G47.33 OSA (OBSTRUCTIVE SLEEP APNEA): Status: ACTIVE | Noted: 2018-04-25

## 2022-09-19 ENCOUNTER — PATIENT MESSAGE (OUTPATIENT)
Dept: INTERNAL MEDICINE CLINIC | Age: 37
End: 2022-09-19

## 2022-09-22 NOTE — TELEPHONE ENCOUNTER
Has not been seen since 1/20/2021. Last refill for his lexapro 20mg daily was 9/20/2021 for 90 days with 1 refill    He is also listed to be taking levothyroxine 75mcg daily with 2 tab every Sunday and Wednesday. Last refill for this prescription was 8/10/2021 fo 30 tab with 5 refills. Patient requesting refills for medications today. Recommended patient schedule appointment to be reestablished and evaluated for his depression and hypothyroid.

## 2022-11-10 ENCOUNTER — OFFICE VISIT (OUTPATIENT)
Dept: INTERNAL MEDICINE CLINIC | Age: 37
End: 2022-11-10
Payer: COMMERCIAL

## 2022-11-10 VITALS
TEMPERATURE: 98.4 F | RESPIRATION RATE: 16 BRPM | OXYGEN SATURATION: 96 % | BODY MASS INDEX: 24.92 KG/M2 | HEIGHT: 73 IN | WEIGHT: 188 LBS | DIASTOLIC BLOOD PRESSURE: 87 MMHG | HEART RATE: 76 BPM | SYSTOLIC BLOOD PRESSURE: 131 MMHG

## 2022-11-10 DIAGNOSIS — F10.21 ALCOHOLISM IN REMISSION (HCC): ICD-10-CM

## 2022-11-10 DIAGNOSIS — E03.8 HYPOTHYROIDISM DUE TO HASHIMOTO'S THYROIDITIS: ICD-10-CM

## 2022-11-10 DIAGNOSIS — E06.3 HYPOTHYROIDISM DUE TO HASHIMOTO'S THYROIDITIS: ICD-10-CM

## 2022-11-10 DIAGNOSIS — F32.5 MAJOR DEPRESSIVE DISORDER WITH SINGLE EPISODE, IN FULL REMISSION (HCC): ICD-10-CM

## 2022-11-10 DIAGNOSIS — Z00.00 WELL ADULT ON ROUTINE HEALTH CHECK: Primary | ICD-10-CM

## 2022-11-10 DIAGNOSIS — Z23 NEEDS FLU SHOT: ICD-10-CM

## 2022-11-10 DIAGNOSIS — F32.5 MAJOR DEPRESSION IN REMISSION (HCC): ICD-10-CM

## 2022-11-10 DIAGNOSIS — Z11.59 ENCOUNTER FOR HEPATITIS C SCREENING TEST FOR LOW RISK PATIENT: ICD-10-CM

## 2022-11-10 PROBLEM — G47.33 OSA (OBSTRUCTIVE SLEEP APNEA): Status: RESOLVED | Noted: 2018-04-25 | Resolved: 2022-11-10

## 2022-11-10 PROCEDURE — 90686 IIV4 VACC NO PRSV 0.5 ML IM: CPT | Performed by: STUDENT IN AN ORGANIZED HEALTH CARE EDUCATION/TRAINING PROGRAM

## 2022-11-10 PROCEDURE — 90471 IMMUNIZATION ADMIN: CPT | Performed by: STUDENT IN AN ORGANIZED HEALTH CARE EDUCATION/TRAINING PROGRAM

## 2022-11-10 PROCEDURE — 99395 PREV VISIT EST AGE 18-39: CPT | Performed by: STUDENT IN AN ORGANIZED HEALTH CARE EDUCATION/TRAINING PROGRAM

## 2022-11-10 RX ORDER — ESCITALOPRAM OXALATE 20 MG/1
TABLET ORAL
Qty: 90 TABLET | Refills: 1 | Status: SHIPPED | OUTPATIENT
Start: 2022-11-10

## 2022-11-10 NOTE — PROGRESS NOTES
Roxy Mariscal is a 42y.o. year old male who is a new patient to me today (11/10/22). He was previous followed by Dr Ila Encarnacion. Assessment & Plan:   1. Well adult on routine health check  Comments:  discussed diet, exercise, reviewed HM  Orders:  -     CBC WITH AUTOMATED DIFF; Future  -     METABOLIC PANEL, COMPREHENSIVE; Future  -     LIPID PANEL; Future  2. Hypothyroidism due to Hashimoto's thyroiditis  Assessment & Plan:  He has been on synthroid for a month and prefers to decrease med burden. Labs show subclinical hypothyroidism historically, will repeat labs today and advise if he needs to restart med   Orders:  -     TSH 3RD GENERATION; Future  -     T4, FREE; Future  3. Major depression in remission [facility])  Assessment & Plan:  Reports his depression was episodic related to the passing of his brother, but when he tries to stop med on his own he becomes irritable. I feel he may be suffering from discontinuation syndrome, rather than return of depression or uncovering of anxiety. He is indifferent about staying on the medication or coming off, not having any side effects. I suggest he take the medication regularly for the next couple of months and then if he wants to try to taper off he should make an appointment with me for guidance in doing such. 4. Major depressive disorder with single episode, in full remission ([facility] [address]  -     escitalopram oxalate (LEXAPRO) 20 mg tablet; TAKE 1 TABLET BY MOUTH EVERY DAY, Normal, Disp-90 Tablet, R-1  5. Alcoholism in remission [facility])  Assessment & Plan:  Congratulated on maintaining sobriety   6. Encounter for hepatitis C screening test for low risk patient  -     HEPATITIS C AB; Future  7.  Needs flu shot  -     INFLUENZA, FLUARIX, FLULAVAL, FLUZONE (AGE 6 MO+), AFLURIA(AGE 3Y+) IM, PF, 0.5 ML       Health Maintenance   Flu vaccine: will get today   COVID vaccine: discussed bivalent booster   Tetanus vaccine: 2/2017  Shingles vaccine:  Pneumonia vaccine:  Colon cancer screening: @45  PSA: @55  Hep C: check today   Lipid: check today   DM: 5.5% 2019  Healthcare decision maker: wife  ACP:      RTC: 1 year or sooner PRN to discuss lexapro    Subjective:   Nuno Roper was seen today for Thyroid Problem    Hypothyroid - prescribed synthroid but hasn't been taking for about a month. He doesn't like to take medication. No constipation, weight gain, fatigue, etc.    Depression - when he was drinking a lot he started taking lexapro. His brother also passed away at this time. If he stops taking it after a couple days he feels irritable. Alcoholism - quit 4/30/2018. Continues to be sober. FADI - resolved after weight loss, he had repeat eval with sleep medicine who told him not to use CPAP    HTN - previously on medication but stopped, in office BP has been normal    HLD - previously on statin but stopped when he lost weight and quit drinking    Exercise - Runs, plays basketball 3-4x/week at least a hour    Diet - cooks at home on the weekdays, eats out on the weekends. Wife in charge of meal planning. Review of Systems   All other systems reviewed and are negative. PMHx    Patient Active Problem List   Diagnosis Code    Osteoarthrosis, unspecified whether generalized or localized, lower leg [id number]    Autoimmune hypothyroidism E06.3    Mixed hyperlipidemia E78.2    Major depression in remission ([facility] [address] F32.5    Alcoholism in remission ([facility] [address] F10.21       Prior to Admission medications    Medication Sig Start Date End Date Taking? Authorizing Provider   escitalopram oxalate (LEXAPRO) 20 mg tablet TAKE 1 TABLET BY MOUTH EVERY DAY 11/10/22  Yes Luis Polo MD   escitalopram oxalate (LEXAPRO) 20 mg tablet TAKE 1 TABLET BY MOUTH EVERY DAY 9/23/21 11/10/22  Isrrael Teixeira MD   levothyroxine (SYNTHROID) 75 mcg tablet TAKE 1 TAB BY MOUTH DAILY (BEFORE BREAKFAST).  TAKE 2 ON Sunday AND Wednesday - new directions 8/10/21 11/10/22  Isrrael Teixeira MD       The following sections were reviewed & updated as appropriate: Problem List, Allergies, PMH, PSH, FH, and SH. Objective:   Visit Vitals  /87   Pulse 76   Temp 98.4 °F (36.9 °C) (Temporal)   Resp 16   Ht 6' 1\" (1.854 m)   Wt 188 lb (85.3 kg)   SpO2 96%   BMI 24.80 kg/m²       Physical Exam  Constitutional:       General: He is not in acute distress. Eyes:      Extraocular Movements: Extraocular movements intact. Conjunctiva/sclera: Conjunctivae normal.   Cardiovascular:      Rate and Rhythm: Normal rate and regular rhythm. Heart sounds: No murmur heard. Pulmonary:      Effort: Pulmonary effort is normal. No respiratory distress. Abdominal:      General: Bowel sounds are normal.      Palpations: Abdomen is soft. Musculoskeletal:      Right lower leg: No edema. Left lower leg: No edema. Neurological:      General: No focal deficit present. Mental Status: He is alert.    Psychiatric:         Mood and Affect: Mood normal.         Behavior: Behavior normal.            Raven Crawford MD

## 2022-11-10 NOTE — ASSESSMENT & PLAN NOTE
He has been on synthroid for a month and prefers to decrease med burden.  Labs show subclinical hypothyroidism historically, will repeat labs today and advise if he needs to restart med

## 2022-11-10 NOTE — PROGRESS NOTES
Verified name and birth date for privacy precautions. Chart reviewed in preparation for today's visit. Chief Complaint   Patient presents with    Thyroid Problem          Health Maintenance Due   Topic    Hepatitis C Screening     Depression Monitoring     COVID-19 Vaccine (3 - Booster for Pfizer series)    Flu Vaccine (1)         Wt Readings from Last 3 Encounters:   11/10/22 188 lb (85.3 kg)   01/20/21 193 lb 3.2 oz (87.6 kg)   08/28/19 188 lb 9.6 oz (85.5 kg)     Temp Readings from Last 3 Encounters:   11/10/22 98.4 °F (36.9 °C) (Temporal)   01/20/21 97.5 °F (36.4 °C) (Temporal)   08/28/19 98.1 °F (36.7 °C)     BP Readings from Last 3 Encounters:   11/10/22 131/87   01/20/21 130/83   08/28/19 122/78     Pulse Readings from Last 3 Encounters:   11/10/22 76   01/20/21 69   08/28/19 73         Learning Assessment:  :     Learning Assessment 11/20/2017 3/31/2014   PRIMARY LEARNER Patient Patient   HIGHEST LEVEL OF EDUCATION - PRIMARY LEARNER  - GRADUATED HIGH SCHOOL OR GED   BARRIERS PRIMARY LEARNER - NONE   CO-LEARNER CAREGIVER - No   PRIMARY LANGUAGE ENGLISH ENGLISH   LEARNER PREFERENCE PRIMARY VIDEOS DEMONSTRATION   ANSWERED BY patient patient   RELATIONSHIP SELF SELF       Depression Screening:  :     3 most recent PHQ Screens 11/10/2022   Little interest or pleasure in doing things Not at all   Feeling down, depressed, irritable, or hopeless Not at all   Total Score PHQ 2 0       Fall Risk Assessment:  :     No flowsheet data found. Abuse Screening:  :     Abuse Screening Questionnaire 11/10/2022   Do you ever feel afraid of your partner? N   Are you in a relationship with someone who physically or mentally threatens you? N   Is it safe for you to go home?  Johnny Nava

## 2022-11-10 NOTE — ASSESSMENT & PLAN NOTE
Reports his depression was episodic related to the passing of his brother, but when he tries to stop med on his own he becomes irritable. I feel he may be suffering from discontinuation syndrome, rather than return of depression or uncovering of anxiety. He is indifferent about staying on the medication or coming off, not having any side effects. I suggest he take the medication regularly for the next couple of months and then if he wants to try to taper off he should make an appointment with me for guidance in doing such.

## 2022-11-10 NOTE — PATIENT INSTRUCTIONS
Vaccine Information Statement    Influenza (Flu) Vaccine (Inactivated or Recombinant): What You Need to Know    Many vaccine information statements are available in Khmer and other languages. See www.immunize.org/vis. Hojas de información sobre vacunas están disponibles en español y en muchos otros idiomas. Visite www.immunize.org/vis. 1. Why get vaccinated? Influenza vaccine can prevent influenza (flu). Flu is a contagious disease that spreads around the [facility] every year, usually between October and May. Anyone can get the flu, but it is more dangerous for some people. Infants and young children, people 72 years and older, pregnant people, and people with certain health conditions or a weakened immune system are at greatest risk of flu complications. Pneumonia, bronchitis, sinus infections, and ear infections are examples of flu-related complications. If you have a medical condition, such as heart disease, cancer, or diabetes, flu can make it worse. Flu can cause fever and chills, sore throat, muscle aches, fatigue, cough, headache, and runny or stuffy nose. Some people may have vomiting and diarrhea, though this is more common in children than adults. In an average year, thousands of people in the [facility] die from flu, and many more are hospitalized. Flu vaccine prevents millions of illnesses and flu-related visits to the doctor each year. 2. Influenza vaccines     CDC recommends everyone 6 months and older get vaccinated every flu season. Children 6 months through 6years of age may need 2 doses during a single flu season. Everyone else needs only 1 dose each flu season. It takes about 2 weeks for protection to develop after vaccination. There are many flu viruses, and they are always changing. Each year a new flu vaccine is made to protect against the influenza viruses believed to be likely to cause disease in the upcoming flu season.  Even when the vaccine doesnt exactly match these viruses, it may still provide some protection. Influenza vaccine does not cause flu. Influenza vaccine may be given at the same time as other vaccines. 3. Talk with your health care provider    Tell your vaccination provider if the person getting the vaccine:  Has had an allergic reaction after a previous dose of influenza vaccine, or has any severe, life-threatening allergies   Has ever had Guillain-Barré Syndrome (also called GBS)    In some cases, your health care provider may decide to postpone influenza vaccination until a future visit. Influenza vaccine can be administered at any time during pregnancy. People who are or will be pregnant during influenza season should receive inactivated influenza vaccine. People with minor illnesses, such as a cold, may be vaccinated. People who are moderately or severely ill should usually wait until they recover before getting influenza vaccine. Your health care provider can give you more information. 4. Risks of a vaccine reaction    Soreness, redness, and swelling where the shot is given, fever, muscle aches, and headache can happen after influenza vaccination. There may be a very small increased risk of Guillain-Barré Syndrome (GBS) after inactivated influenza vaccine (the flu shot). Shayan Contreras children who get the flu shot along with pneumococcal vaccine (PCV13) and/or DTaP vaccine at the same time might be slightly more likely to have a seizure caused by fever. Tell your health care provider if a child who is getting flu vaccine has ever had a seizure. People sometimes faint after medical procedures, including vaccination. Tell your provider if you feel dizzy or have vision changes or ringing in the ears. As with any medicine, there is a very remote chance of a vaccine causing a severe allergic reaction, other serious injury, or death. 5. What if there is a serious problem?     An allergic reaction could occur after the vaccinated person leaves the clinic. If you see signs of a severe allergic reaction (hives, swelling of the face and throat, difficulty breathing, a fast heartbeat, dizziness, or weakness), call 9-1-1 and get the person to the nearest hospital.    For other signs that concern you, call your health care provider. Adverse reactions should be reported to the Vaccine Adverse Event Reporting System (VAERS). Your health care provider will usually file this report, or you can do it yourself. Visit the VAERS website at www.vaers. [facility].gov or call 6-730.101.8509. VAERS is only for reporting reactions, and VAERS staff members do not give medical advice. 6. The National Vaccine Injury Compensation Program    The [facility] Vaccine Injury Compensation Program (VICP) is a federal program that was created to compensate people who may have been injured by certain vaccines. Claims regarding alleged injury or death due to vaccination have a time limit for filing, which may be as short as two years. Visit the VICP website at www.[facility].gov/vaccinecompensation or call 8-622.135.1907 to learn about the program and about filing a claim. 7. How can I learn more? Ask your health care provider. Call your local or state health department. Visit the website of the Food and Drug Administration (FDA) for vaccine package inserts and additional information at www.fda.gov/vaccines-blood-biologics/vaccines. Contact the Centers for Disease Control and Prevention (CDC): Call 3-949.405.2458 (5-301-XLV-INFO) or  Visit CDCs influenza website at www.cdc.gov/flu. Vaccine Information Statement   Inactivated Influenza Vaccine   8/6/2021  42 FLORA Hardy 974XL-12   Department of Health and Human Services  Centers for Disease Control and Prevention    Office Use Only

## 2022-11-16 LAB
ALBUMIN SERPL-MCNC: 4.9 G/DL (ref 4–5)
ALBUMIN/GLOB SERPL: 2 {RATIO} (ref 1.2–2.2)
ALP SERPL-CCNC: 65 IU/L (ref 44–121)
ALT SERPL-CCNC: 18 IU/L (ref 0–44)
AST SERPL-CCNC: 20 IU/L (ref 0–40)
BASOPHILS # BLD AUTO: 0.1 X10E3/UL (ref 0–0.2)
BASOPHILS NFR BLD AUTO: 2 %
BILIRUB SERPL-MCNC: 0.6 MG/DL (ref 0–1.2)
BUN SERPL-MCNC: 17 MG/DL (ref 6–20)
BUN/CREAT SERPL: 18 (ref 9–20)
CALCIUM SERPL-MCNC: 9.7 MG/DL (ref 8.7–10.2)
CHLORIDE SERPL-SCNC: 102 MMOL/L (ref 96–106)
CHOLEST SERPL-MCNC: 189 MG/DL (ref 100–199)
CO2 SERPL-SCNC: 27 MMOL/L (ref 20–29)
CREAT SERPL-MCNC: 0.96 MG/DL (ref 0.76–1.27)
EGFR: 103 ML/MIN/1.73
EOSINOPHIL # BLD AUTO: 0.2 X10E3/UL (ref 0–0.4)
EOSINOPHIL NFR BLD AUTO: 6 %
ERYTHROCYTE [DISTWIDTH] IN BLOOD BY AUTOMATED COUNT: 12.7 % (ref 11.6–15.4)
GLOBULIN SER CALC-MCNC: 2.5 G/DL (ref 1.5–4.5)
GLUCOSE SERPL-MCNC: 99 MG/DL (ref 70–99)
HCT VFR BLD AUTO: 42.4 % (ref 37.5–51)
HCV AB S/CO SERPL IA: <0.1 S/CO RATIO (ref 0–0.9)
HDLC SERPL-MCNC: 44 MG/DL
HGB BLD-MCNC: 14.6 G/DL (ref 13–17.7)
IMM GRANULOCYTES # BLD AUTO: 0 X10E3/UL (ref 0–0.1)
IMM GRANULOCYTES NFR BLD AUTO: 0 %
LDLC SERPL CALC-MCNC: 133 MG/DL (ref 0–99)
LYMPHOCYTES # BLD AUTO: 1.5 X10E3/UL (ref 0.7–3.1)
LYMPHOCYTES NFR BLD AUTO: 36 %
MCH RBC QN AUTO: 29.9 PG (ref 26.6–33)
MCHC RBC AUTO-ENTMCNC: 34.4 G/DL (ref 31.5–35.7)
MCV RBC AUTO: 87 FL (ref 79–97)
MONOCYTES # BLD AUTO: 0.3 X10E3/UL (ref 0.1–0.9)
MONOCYTES NFR BLD AUTO: 7 %
NEUTROPHILS # BLD AUTO: 2.1 X10E3/UL (ref 1.4–7)
NEUTROPHILS NFR BLD AUTO: 49 %
PLATELET # BLD AUTO: 224 X10E3/UL (ref 150–450)
POTASSIUM SERPL-SCNC: 5 MMOL/L (ref 3.5–5.2)
PROT SERPL-MCNC: 7.4 G/DL (ref 6–8.5)
RBC # BLD AUTO: 4.88 X10E6/UL (ref 4.14–5.8)
SODIUM SERPL-SCNC: 141 MMOL/L (ref 134–144)
T4 FREE SERPL-MCNC: 0.97 NG/DL (ref 0.82–1.77)
TRIGL SERPL-MCNC: 66 MG/DL (ref 0–149)
TSH SERPL DL<=0.005 MIU/L-ACNC: 5.74 UIU/ML (ref 0.45–4.5)
VLDLC SERPL CALC-MCNC: 12 MG/DL (ref 5–40)
WBC # BLD AUTO: 4.2 X10E3/UL (ref 3.4–10.8)

## 2022-12-17 DIAGNOSIS — F32.5 MAJOR DEPRESSIVE DISORDER WITH SINGLE EPISODE, IN FULL REMISSION (HCC): ICD-10-CM

## 2022-12-19 RX ORDER — ESCITALOPRAM OXALATE 20 MG/1
20 TABLET ORAL DAILY
Qty: 90 TABLET | Refills: 3 | Status: SHIPPED | OUTPATIENT
Start: 2022-12-19

## 2024-05-03 ENCOUNTER — OFFICE VISIT (OUTPATIENT)
Age: 39
End: 2024-05-03
Payer: COMMERCIAL

## 2024-05-03 VITALS
RESPIRATION RATE: 15 BRPM | SYSTOLIC BLOOD PRESSURE: 130 MMHG | OXYGEN SATURATION: 97 % | HEIGHT: 71 IN | BODY MASS INDEX: 26.73 KG/M2 | WEIGHT: 190.9 LBS | TEMPERATURE: 98.6 F | DIASTOLIC BLOOD PRESSURE: 85 MMHG | HEART RATE: 76 BPM

## 2024-05-03 DIAGNOSIS — Z00.00 ROUTINE GENERAL MEDICAL EXAMINATION AT A HEALTH CARE FACILITY: Primary | ICD-10-CM

## 2024-05-03 DIAGNOSIS — F10.21 ALCOHOL DEPENDENCE, IN REMISSION (HCC): ICD-10-CM

## 2024-05-03 DIAGNOSIS — Z11.4 ENCOUNTER FOR SCREENING FOR HIV: ICD-10-CM

## 2024-05-03 DIAGNOSIS — E03.8 SUBCLINICAL HYPOTHYROIDISM: ICD-10-CM

## 2024-05-03 PROBLEM — F32.5 MAJOR DEPRESSION IN REMISSION (HCC): Status: RESOLVED | Noted: 2018-04-25 | Resolved: 2024-05-03

## 2024-05-03 PROCEDURE — 99396 PREV VISIT EST AGE 40-64: CPT | Performed by: STUDENT IN AN ORGANIZED HEALTH CARE EDUCATION/TRAINING PROGRAM

## 2024-05-03 SDOH — ECONOMIC STABILITY: FOOD INSECURITY: WITHIN THE PAST 12 MONTHS, YOU WORRIED THAT YOUR FOOD WOULD RUN OUT BEFORE YOU GOT MONEY TO BUY MORE.: NEVER TRUE

## 2024-05-03 SDOH — ECONOMIC STABILITY: HOUSING INSECURITY
IN THE LAST 12 MONTHS, WAS THERE A TIME WHEN YOU DID NOT HAVE A STEADY PLACE TO SLEEP OR SLEPT IN A SHELTER (INCLUDING NOW)?: NO

## 2024-05-03 SDOH — ECONOMIC STABILITY: FOOD INSECURITY: WITHIN THE PAST 12 MONTHS, THE FOOD YOU BOUGHT JUST DIDN'T LAST AND YOU DIDN'T HAVE MONEY TO GET MORE.: NEVER TRUE

## 2024-05-03 SDOH — ECONOMIC STABILITY: INCOME INSECURITY: HOW HARD IS IT FOR YOU TO PAY FOR THE VERY BASICS LIKE FOOD, HOUSING, MEDICAL CARE, AND HEATING?: NOT HARD AT ALL

## 2024-05-03 ASSESSMENT — PATIENT HEALTH QUESTIONNAIRE - PHQ9
6. FEELING BAD ABOUT YOURSELF - OR THAT YOU ARE A FAILURE OR HAVE LET YOURSELF OR YOUR FAMILY DOWN: NOT AT ALL
SUM OF ALL RESPONSES TO PHQ9 QUESTIONS 1 & 2: 0
SUM OF ALL RESPONSES TO PHQ QUESTIONS 1-9: 0
3. TROUBLE FALLING OR STAYING ASLEEP: NOT AT ALL
4. FEELING TIRED OR HAVING LITTLE ENERGY: NOT AT ALL
SUM OF ALL RESPONSES TO PHQ QUESTIONS 1-9: 0
9. THOUGHTS THAT YOU WOULD BE BETTER OFF DEAD, OR OF HURTING YOURSELF: NOT AT ALL
5. POOR APPETITE OR OVEREATING: NOT AT ALL
SUM OF ALL RESPONSES TO PHQ QUESTIONS 1-9: 0
7. TROUBLE CONCENTRATING ON THINGS, SUCH AS READING THE NEWSPAPER OR WATCHING TELEVISION: NOT AT ALL
8. MOVING OR SPEAKING SO SLOWLY THAT OTHER PEOPLE COULD HAVE NOTICED. OR THE OPPOSITE, BEING SO FIGETY OR RESTLESS THAT YOU HAVE BEEN MOVING AROUND A LOT MORE THAN USUAL: NOT AT ALL
1. LITTLE INTEREST OR PLEASURE IN DOING THINGS: NOT AT ALL
2. FEELING DOWN, DEPRESSED OR HOPELESS: NOT AT ALL
SUM OF ALL RESPONSES TO PHQ QUESTIONS 1-9: 0

## 2024-05-03 NOTE — PROGRESS NOTES
Donald El is a 38 y.o. year old male who presents today (05/03/24) for annual physical exam.    Assessment & Plan:   1. Routine general medical examination at a health care facility  Reviewed diet and exercise habits - he does very well with this. Updated health maintenance, see below. Check screening labs.   Mood is good off lexapro  -     CBC; Future  -     Comprehensive Metabolic Panel; Future  -     Lipid Panel; Future  2. Subclinical hypothyroidism  Clinically euthroid. Will update labs  -     T4, Free; Future  -     TSH; Future  3. Alcohol dependence, in remission (HCC)  Sober x6yr, congratulated on this  4. Encounter for screening for HIV  -     HIV 1/2 Ag/Ab, 4TH Generation,W Rflx Confirm; Future        Health Maintenance   Flu vaccine:   COVID vaccine: declines booster  Tetanus vaccine: 2/2017  Shingles vaccine:  Pneumonia vaccine:  Colon cancer screening: @45  PSA: @55  Hep C: 11/2022   HIV: will check today   Lipid: 11/2022  DM: 5.5% 2019  Healthcare decision maker: wife  ACP:    RTC: 1-1.5 year annual, sooner PRN    Subjective:   Donald was seen today for Annual Exam    Depression - stopped taking lexapro shortly after our last visit. Doing something active every day does help him     Subclinical hypothyroidism - no sx      Diet: wife meal preps during the week but on the weekends he may \"spluge\"    Physical activity: basketball MWF morning, runs sat and sun AM, does b-ball or soft ball T/TH    Care Team/Specialists:    Review of Systems   All other systems reviewed and are negative.        PMHx    Patient Active Problem List   Diagnosis    Alcoholism in remission (HCC)    Mixed hyperlipidemia    Autoimmune hypothyroidism       Prior to Admission medications    Not on File       The following sections were reviewed & updated as appropriate: Problem List, Allergies, PMH, PSH, FH, and SH.      Objective:   /85   Pulse 76   Temp 98.6 °F (37 °C)   Resp 15   Ht 1.815 m (5' 11.46\")   Wt 86.6 kg

## 2024-06-01 LAB
ALBUMIN SERPL-MCNC: 4.9 G/DL (ref 4.1–5.1)
ALBUMIN/GLOB SERPL: 2 {RATIO} (ref 1.2–2.2)
ALP SERPL-CCNC: 73 IU/L (ref 44–121)
ALT SERPL-CCNC: 28 IU/L (ref 0–44)
AST SERPL-CCNC: 25 IU/L (ref 0–40)
BILIRUB SERPL-MCNC: 0.8 MG/DL (ref 0–1.2)
BUN SERPL-MCNC: 16 MG/DL (ref 6–24)
BUN/CREAT SERPL: 17 (ref 9–20)
CALCIUM SERPL-MCNC: 9.9 MG/DL (ref 8.7–10.2)
CHLORIDE SERPL-SCNC: 102 MMOL/L (ref 96–106)
CHOLEST SERPL-MCNC: 208 MG/DL (ref 100–199)
CO2 SERPL-SCNC: 24 MMOL/L (ref 20–29)
CREAT SERPL-MCNC: 0.95 MG/DL (ref 0.76–1.27)
EGFRCR SERPLBLD CKD-EPI 2021: 104 ML/MIN/1.73
ERYTHROCYTE [DISTWIDTH] IN BLOOD BY AUTOMATED COUNT: 13 % (ref 11.6–15.4)
GLOBULIN SER CALC-MCNC: 2.5 G/DL (ref 1.5–4.5)
GLUCOSE SERPL-MCNC: 93 MG/DL (ref 70–99)
HCT VFR BLD AUTO: 45 % (ref 37.5–51)
HDLC SERPL-MCNC: 47 MG/DL
HGB BLD-MCNC: 15.5 G/DL (ref 13–17.7)
HIV 1+2 AB+HIV1 P24 AG SERPL QL IA: NON REACTIVE
LDLC SERPL CALC-MCNC: 144 MG/DL (ref 0–99)
MCH RBC QN AUTO: 30.4 PG (ref 26.6–33)
MCHC RBC AUTO-ENTMCNC: 34.4 G/DL (ref 31.5–35.7)
MCV RBC AUTO: 88 FL (ref 79–97)
PLATELET # BLD AUTO: 244 X10E3/UL (ref 150–450)
POTASSIUM SERPL-SCNC: 5 MMOL/L (ref 3.5–5.2)
PROT SERPL-MCNC: 7.4 G/DL (ref 6–8.5)
RBC # BLD AUTO: 5.1 X10E6/UL (ref 4.14–5.8)
SODIUM SERPL-SCNC: 140 MMOL/L (ref 134–144)
T4 FREE SERPL-MCNC: 1.11 NG/DL (ref 0.82–1.77)
TRIGL SERPL-MCNC: 96 MG/DL (ref 0–149)
TSH SERPL DL<=0.005 MIU/L-ACNC: 4.04 UIU/ML (ref 0.45–4.5)
VLDLC SERPL CALC-MCNC: 17 MG/DL (ref 5–40)
WBC # BLD AUTO: 6.9 X10E3/UL (ref 3.4–10.8)